# Patient Record
Sex: FEMALE | Race: WHITE | Employment: UNEMPLOYED | URBAN - METROPOLITAN AREA
[De-identification: names, ages, dates, MRNs, and addresses within clinical notes are randomized per-mention and may not be internally consistent; named-entity substitution may affect disease eponyms.]

---

## 2019-03-12 ENCOUNTER — HOSPITAL ENCOUNTER (OUTPATIENT)
Age: 54
Discharge: HOME | End: 2019-03-12
Payer: COMMERCIAL

## 2019-03-12 VITALS
DIASTOLIC BLOOD PRESSURE: 86 MMHG | HEART RATE: 65 BPM | BODY MASS INDEX: 22 KG/M2 | RESPIRATION RATE: 16 BRPM | TEMPERATURE: 98 F | WEIGHT: 150 LBS | SYSTOLIC BLOOD PRESSURE: 133 MMHG | OXYGEN SATURATION: 100 %

## 2019-03-12 DIAGNOSIS — R09.82 PND (POST-NASAL DRIP): ICD-10-CM

## 2019-03-12 DIAGNOSIS — J06.9 VIRAL URI WITH COUGH: Primary | ICD-10-CM

## 2019-03-12 LAB — POCT RAPID STREP: NEGATIVE

## 2019-03-12 PROCEDURE — 87430 STREP A AG IA: CPT | Performed by: NURSE PRACTITIONER

## 2019-03-12 PROCEDURE — 87081 CULTURE SCREEN ONLY: CPT | Performed by: NURSE PRACTITIONER

## 2019-03-12 PROCEDURE — 99204 OFFICE O/P NEW MOD 45 MIN: CPT

## 2019-03-12 RX ORDER — BENZONATATE 100 MG/1
100 CAPSULE ORAL 3 TIMES DAILY PRN
Qty: 30 CAPSULE | Refills: 0 | Status: SHIPPED | OUTPATIENT
Start: 2019-03-12 | End: 2019-04-11

## 2019-03-12 RX ORDER — IBUPROFEN 600 MG/1
600 TABLET ORAL ONCE
Status: COMPLETED | OUTPATIENT
Start: 2019-03-12 | End: 2019-03-12

## 2019-03-12 NOTE — ED PROVIDER NOTES
Patient Seen in: [address] [facility] [place]    History   Patient presents with:  Cough/URI  Sore Throat    Stated Complaint: sore throat    42-year-old female who presents to the immediate care with complaints of a sore throat and cough since Friday Pulse 65   Resp 16   Temp 97.5 °F (36.4 °C)   Temp src Temporal   SpO2 100 %   O2 Device None (Room air)       Current:/86   Pulse 65   Temp 97.5 °F (36.4 °C) (Temporal)   Resp 16   Wt 68 kg   SpO2 100%   BMI 22.15 kg/m²         Physical Exam   Karley problems or complications as discussed and voiced understanding and all questions were answered at this time.         Disposition and Plan     Clinical Impression:  Viral URI with cough  (primary encounter diagnosis)  PND (post-nasal drip)    Disposition:

## 2019-11-27 ENCOUNTER — HOSPITAL ENCOUNTER (OUTPATIENT)
Age: 54
Discharge: HOME | End: 2019-11-27
Attending: FAMILY MEDICINE
Payer: COMMERCIAL

## 2019-11-27 VITALS
OXYGEN SATURATION: 98 % | DIASTOLIC BLOOD PRESSURE: 78 MMHG | RESPIRATION RATE: 16 BRPM | HEIGHT: 69 IN | BODY MASS INDEX: 22.96 KG/M2 | HEART RATE: 67 BPM | SYSTOLIC BLOOD PRESSURE: 112 MMHG | TEMPERATURE: 97 F | WEIGHT: 155 LBS

## 2019-11-27 DIAGNOSIS — H66.90 ACUTE OTITIS MEDIA, UNSPECIFIED OTITIS MEDIA TYPE: Primary | ICD-10-CM

## 2019-11-27 DIAGNOSIS — J01.90 ACUTE SINUSITIS, RECURRENCE NOT SPECIFIED, UNSPECIFIED LOCATION: ICD-10-CM

## 2019-11-27 PROCEDURE — 99213 OFFICE O/P EST LOW 20 MIN: CPT

## 2019-11-27 PROCEDURE — 99214 OFFICE O/P EST MOD 30 MIN: CPT

## 2019-11-27 RX ORDER — IBUPROFEN 600 MG/1
600 TABLET ORAL ONCE
Status: COMPLETED | OUTPATIENT
Start: 2019-11-27 | End: 2019-11-27

## 2019-11-27 RX ORDER — METHYLPREDNISOLONE 4 MG/1
TABLET ORAL
Qty: 1 PACKAGE | Refills: 0 | Status: SHIPPED | OUTPATIENT
Start: 2019-11-27

## 2019-11-27 RX ORDER — AMOXICILLIN AND CLAVULANATE POTASSIUM 875; 125 MG/1; MG/1
1 TABLET, FILM COATED ORAL 2 TIMES DAILY
Qty: 20 TABLET | Refills: 0 | Status: SHIPPED | OUTPATIENT
Start: 2019-11-27 | End: 2019-12-07

## 2019-11-27 NOTE — ED INITIAL ASSESSMENT (HPI)
Patient started with scratchy throat last week. Yesterday she woke up congested, coughing- productive with yellow thick mucus. Today she has sinus pressure and right ear pain. No fevers.

## 2019-11-27 NOTE — ED PROVIDER NOTES
Patient Seen in: [address] [facility]      History   Patient presents with:  Cough/URI  Ear Problem Pain (neurosensory)    Stated Complaint: ear pain/cold symptoms    HPI    61-year-old female presents with sinus congestion, right ear pain, co auscultation bilaterally  Heart S1-S2 heard no murmurs no gallops  Skin shows no rash        ED Course   Labs Reviewed - No data to display             MDM     Right ear otitis media. Augmentin as directed.   Medrol Dosepak as directed congestion, sinus pa

## 2022-01-03 ENCOUNTER — HOSPITAL ENCOUNTER (OUTPATIENT)
Age: 57
Discharge: HOME | End: 2022-01-03
Payer: COMMERCIAL

## 2022-01-03 VITALS
BODY MASS INDEX: 23.7 KG/M2 | HEIGHT: 69 IN | OXYGEN SATURATION: 100 % | WEIGHT: 160 LBS | RESPIRATION RATE: 14 BRPM | SYSTOLIC BLOOD PRESSURE: 121 MMHG | DIASTOLIC BLOOD PRESSURE: 84 MMHG | TEMPERATURE: 97 F | HEART RATE: 64 BPM

## 2022-01-03 DIAGNOSIS — J01.90 ACUTE VIRAL SINUSITIS: ICD-10-CM

## 2022-01-03 DIAGNOSIS — H65.191 ACUTE EFFUSION OF RIGHT EAR: ICD-10-CM

## 2022-01-03 DIAGNOSIS — B97.89 ACUTE VIRAL SINUSITIS: ICD-10-CM

## 2022-01-03 DIAGNOSIS — Z11.52 ENCOUNTER FOR SCREENING FOR COVID-19: Primary | ICD-10-CM

## 2022-01-03 PROCEDURE — 99203 OFFICE O/P NEW LOW 30 MIN: CPT | Performed by: NURSE PRACTITIONER

## 2022-01-03 NOTE — ED PROVIDER NOTES
Patient Seen in: Immediate [address] [place] [address]      History   Patient presents with:  Testing    Stated Complaint: sore throat head ache coughing    Subjective:   HPI    CHIEF COMPLAINT:   Patient presents with:  Testing      HPI:   Kelley Edward is a 49 year Diagnosis Date   • Hypothyroid    • Hypothyroidism               Past Surgical History:   Procedure Laterality Date   • CHOLECYSTECTOMY     • OTHER      sinus surgery                Social History    Tobacco Use      Smoking status: Never Smoker      Smo Discussed physical exam and HPI with patient. Suspect viral etiology as no bacterial focus noted on exam.  Pt has reassuring physical exam consistent with viral URI. Lunges clear, no distress, respirations even and unlabored.   We discussed covid-19 vs ot

## 2022-01-05 LAB — SARS-COV-2 RNA RESP QL NAA+PROBE: DETECTED

## 2022-03-04 ENCOUNTER — HOSPITAL ENCOUNTER (OUTPATIENT)
Age: 57
Discharge: HOME | End: 2022-03-04
Payer: COMMERCIAL

## 2022-03-04 VITALS
BODY MASS INDEX: 23.7 KG/M2 | SYSTOLIC BLOOD PRESSURE: 135 MMHG | DIASTOLIC BLOOD PRESSURE: 80 MMHG | TEMPERATURE: 98 F | WEIGHT: 160 LBS | RESPIRATION RATE: 14 BRPM | OXYGEN SATURATION: 100 % | HEIGHT: 69 IN | HEART RATE: 82 BPM

## 2022-03-04 DIAGNOSIS — J01.90 ACUTE NON-RECURRENT SINUSITIS, UNSPECIFIED LOCATION: ICD-10-CM

## 2022-03-04 DIAGNOSIS — H69.83 EUSTACHIAN TUBE DYSFUNCTION, BILATERAL: Primary | ICD-10-CM

## 2022-03-04 PROCEDURE — 99203 OFFICE O/P NEW LOW 30 MIN: CPT | Performed by: PHYSICIAN ASSISTANT

## 2022-03-04 RX ORDER — AMOXICILLIN AND CLAVULANATE POTASSIUM 875; 125 MG/1; MG/1
1 TABLET, FILM COATED ORAL 2 TIMES DAILY
Qty: 20 TABLET | Refills: 0 | Status: SHIPPED | OUTPATIENT
Start: 2022-03-04 | End: 2022-03-14

## 2022-03-04 NOTE — ED INITIAL ASSESSMENT (HPI)
Pt states for over a week she has had sinus congestion and pressure. States now she has right ear pain and left neck pain. Taking otc meds and feels it is getting worse.  States she feels like it is a sinus infection

## 2022-04-18 ENCOUNTER — HOSPITAL ENCOUNTER (OUTPATIENT)
Age: 57
Discharge: HOME | End: 2022-04-18
Payer: COMMERCIAL

## 2022-04-18 VITALS
HEART RATE: 63 BPM | WEIGHT: 160 LBS | RESPIRATION RATE: 18 BRPM | BODY MASS INDEX: 23.7 KG/M2 | TEMPERATURE: 98 F | DIASTOLIC BLOOD PRESSURE: 81 MMHG | HEIGHT: 69 IN | SYSTOLIC BLOOD PRESSURE: 141 MMHG | OXYGEN SATURATION: 98 %

## 2022-04-18 DIAGNOSIS — H69.81 EUSTACHIAN TUBE DYSFUNCTION, RIGHT: Primary | ICD-10-CM

## 2022-04-18 DIAGNOSIS — H92.03 OTALGIA OF BOTH EARS: ICD-10-CM

## 2022-04-18 PROCEDURE — 99213 OFFICE O/P EST LOW 20 MIN: CPT | Performed by: NURSE PRACTITIONER

## 2022-04-18 RX ORDER — METHYLPREDNISOLONE 4 MG/1
TABLET ORAL
Qty: 1 EACH | Refills: 0 | Status: SHIPPED | OUTPATIENT
Start: 2022-04-18

## 2022-04-18 NOTE — ED INITIAL ASSESSMENT (HPI)
Pt was treated with Amoxil, Augmentin, and Zpack for sinus. Continues to c/o right ear pain and ST    Denies: fevers.

## 2022-07-14 ENCOUNTER — HOSPITAL ENCOUNTER (OUTPATIENT)
Age: 57
Discharge: HOME | End: 2022-07-14
Payer: COMMERCIAL

## 2022-07-14 VITALS
DIASTOLIC BLOOD PRESSURE: 68 MMHG | HEIGHT: 69 IN | BODY MASS INDEX: 23.7 KG/M2 | TEMPERATURE: 99 F | RESPIRATION RATE: 18 BRPM | HEART RATE: 64 BPM | WEIGHT: 160 LBS | OXYGEN SATURATION: 100 % | SYSTOLIC BLOOD PRESSURE: 126 MMHG

## 2022-07-14 DIAGNOSIS — H66.91 RIGHT OTITIS MEDIA, UNSPECIFIED OTITIS MEDIA TYPE: Primary | ICD-10-CM

## 2022-07-14 LAB — SARS-COV-2 RNA RESP QL NAA+PROBE: NOT DETECTED

## 2022-07-14 PROCEDURE — U0002 COVID-19 LAB TEST NON-CDC: HCPCS | Performed by: NURSE PRACTITIONER

## 2022-07-14 PROCEDURE — 99213 OFFICE O/P EST LOW 20 MIN: CPT | Performed by: NURSE PRACTITIONER

## 2022-07-14 RX ORDER — AZITHROMYCIN 250 MG/1
TABLET, FILM COATED ORAL
Qty: 6 TABLET | Refills: 0 | Status: SHIPPED | OUTPATIENT
Start: 2022-07-14 | End: 2022-07-19

## 2022-07-14 NOTE — ED INITIAL ASSESSMENT (HPI)
Pt here w/ c/o sore throat, sinus pressure, right ear pain, cough. Had some stomach discomfort at onset but that has resolved. Was around family that had covid on Saturday.

## 2022-08-17 ENCOUNTER — HOSPITAL ENCOUNTER (OUTPATIENT)
Age: 57
Discharge: HOME | End: 2022-08-17
Payer: COMMERCIAL

## 2022-08-17 VITALS
DIASTOLIC BLOOD PRESSURE: 89 MMHG | RESPIRATION RATE: 18 BRPM | OXYGEN SATURATION: 100 % | SYSTOLIC BLOOD PRESSURE: 122 MMHG | WEIGHT: 160 LBS | TEMPERATURE: 97 F | HEART RATE: 55 BPM | BODY MASS INDEX: 23.7 KG/M2 | HEIGHT: 69 IN

## 2022-08-17 DIAGNOSIS — R10.9 ABDOMINAL PAIN OF UNKNOWN ETIOLOGY: Primary | ICD-10-CM

## 2022-08-17 LAB
#MXD IC: 0.6 X10ˆ3/UL (ref 0.1–1)
BUN BLD-MCNC: 8 MG/DL (ref 7–18)
CHLORIDE BLD-SCNC: 100 MMOL/L (ref 98–112)
CO2 BLD-SCNC: 25 MMOL/L (ref 21–32)
CREAT BLD-MCNC: 0.7 MG/DL
GFR SERPLBLD BASED ON 1.73 SQ M-ARVRAT: 102 ML/MIN/1.73M2 (ref 60–?)
GLUCOSE BLD-MCNC: 90 MG/DL (ref 70–99)
HCT VFR BLD AUTO: 35.9 %
HCT VFR BLD CALC: 34 %
HGB BLD-MCNC: 12.2 G/DL
ISTAT IONIZED CALCIUM FOR CHEM 8: 1.19 MMOL/L (ref 1.12–1.32)
LYMPHOCYTES # BLD AUTO: 1.6 X10ˆ3/UL (ref 1–4)
LYMPHOCYTES NFR BLD AUTO: 31.9 %
MCH RBC QN AUTO: 30.5 PG (ref 26–34)
MCHC RBC AUTO-ENTMCNC: 34 G/DL (ref 31–37)
MCV RBC AUTO: 89.8 FL (ref 80–100)
MIXED CELL %: 11 %
NEUTROPHILS # BLD AUTO: 2.9 X10ˆ3/UL (ref 1.5–7.7)
NEUTROPHILS NFR BLD AUTO: 57.1 %
PLATELET # BLD AUTO: 266 X10ˆ3/UL (ref 150–450)
POCT BILIRUBIN URINE: NEGATIVE
POCT BLOOD URINE: NEGATIVE
POCT GLUCOSE URINE: NEGATIVE MG/DL
POCT KETONE URINE: NEGATIVE MG/DL
POCT NITRITE URINE: NEGATIVE
POCT PH URINE: 5.5 (ref 5–8)
POCT PROTEIN URINE: NEGATIVE MG/DL
POCT SPECIFIC GRAVITY URINE: 1.01
POCT URINE CLARITY: CLEAR
POCT URINE COLOR: YELLOW
POCT UROBILINOGEN URINE: 0.2 MG/DL
POTASSIUM BLD-SCNC: 3.8 MMOL/L (ref 3.6–5.1)
RBC # BLD AUTO: 4 X10ˆ6/UL
SODIUM BLD-SCNC: 136 MMOL/L (ref 136–145)
WBC # BLD AUTO: 5.1 X10ˆ3/UL (ref 4–11)

## 2022-08-17 PROCEDURE — 85025 COMPLETE CBC W/AUTO DIFF WBC: CPT | Performed by: NURSE PRACTITIONER

## 2022-08-17 PROCEDURE — 99213 OFFICE O/P EST LOW 20 MIN: CPT | Performed by: NURSE PRACTITIONER

## 2022-08-17 PROCEDURE — 81002 URINALYSIS NONAUTO W/O SCOPE: CPT | Performed by: NURSE PRACTITIONER

## 2022-08-17 PROCEDURE — 80047 BASIC METABLC PNL IONIZED CA: CPT | Performed by: NURSE PRACTITIONER

## 2022-08-17 NOTE — ED INITIAL ASSESSMENT (HPI)
Mid upper abd pain since Friday. States it is sharp and constant, no radiation. No nausea or vomiting but has no appetite. Few episodes of diarrhea, normal BM the last 3 days. Pt also feels she has a sinus infection. States congestion and pressure since Friday.

## 2022-11-11 ENCOUNTER — HOSPITAL ENCOUNTER (OUTPATIENT)
Age: 57
Discharge: HOME | End: 2022-11-11
Payer: COMMERCIAL

## 2022-11-11 VITALS
OXYGEN SATURATION: 100 % | TEMPERATURE: 98 F | SYSTOLIC BLOOD PRESSURE: 131 MMHG | RESPIRATION RATE: 18 BRPM | DIASTOLIC BLOOD PRESSURE: 79 MMHG | HEART RATE: 60 BPM

## 2022-11-11 DIAGNOSIS — H69.81 DYSFUNCTION OF RIGHT EUSTACHIAN TUBE: Primary | ICD-10-CM

## 2022-11-11 RX ORDER — PREDNISONE 20 MG/1
40 TABLET ORAL DAILY
Qty: 10 TABLET | Refills: 0 | Status: SHIPPED | OUTPATIENT
Start: 2022-11-11 | End: 2022-11-16

## 2022-12-16 ENCOUNTER — HOSPITAL ENCOUNTER (OUTPATIENT)
Age: 57
Discharge: HOME | End: 2022-12-16
Payer: COMMERCIAL

## 2022-12-16 VITALS
OXYGEN SATURATION: 97 % | RESPIRATION RATE: 16 BRPM | SYSTOLIC BLOOD PRESSURE: 99 MMHG | HEART RATE: 72 BPM | DIASTOLIC BLOOD PRESSURE: 67 MMHG | TEMPERATURE: 98 F

## 2022-12-16 DIAGNOSIS — J06.9 VIRAL URI: Primary | ICD-10-CM

## 2022-12-16 LAB
S PYO AG THROAT QL: NEGATIVE
SARS-COV-2 RNA RESP QL NAA+PROBE: NOT DETECTED

## 2022-12-16 PROCEDURE — 87880 STREP A ASSAY W/OPTIC: CPT | Performed by: NURSE PRACTITIONER

## 2022-12-16 PROCEDURE — 99213 OFFICE O/P EST LOW 20 MIN: CPT | Performed by: NURSE PRACTITIONER

## 2022-12-16 PROCEDURE — U0002 COVID-19 LAB TEST NON-CDC: HCPCS | Performed by: NURSE PRACTITIONER

## 2023-01-14 ENCOUNTER — TELEPHONE (OUTPATIENT)
Dept: FAMILY MEDICINE CLINIC | Facility: CLINIC | Age: 58
End: 2023-01-14

## 2023-01-30 ENCOUNTER — MED REC SCAN ONLY (OUTPATIENT)
Dept: FAMILY MEDICINE CLINIC | Facility: CLINIC | Age: 58
End: 2023-01-30

## 2023-03-27 ENCOUNTER — OFFICE VISIT (OUTPATIENT)
Dept: FAMILY MEDICINE CLINIC | Facility: CLINIC | Age: 58
End: 2023-03-27
Payer: COMMERCIAL

## 2023-03-27 VITALS
SYSTOLIC BLOOD PRESSURE: 100 MMHG | WEIGHT: 161 LBS | RESPIRATION RATE: 18 BRPM | TEMPERATURE: 98 F | BODY MASS INDEX: 23.85 KG/M2 | HEART RATE: 64 BPM | HEIGHT: 69 IN | DIASTOLIC BLOOD PRESSURE: 60 MMHG | OXYGEN SATURATION: 95 %

## 2023-03-27 DIAGNOSIS — E04.2 MULTINODULAR GOITER: ICD-10-CM

## 2023-03-27 DIAGNOSIS — Z12.11 SCREEN FOR COLON CANCER: ICD-10-CM

## 2023-03-27 DIAGNOSIS — J30.9 CHRONIC ALLERGIC RHINITIS: ICD-10-CM

## 2023-03-27 DIAGNOSIS — Z00.00 PREVENTATIVE HEALTH CARE: Primary | ICD-10-CM

## 2023-03-27 DIAGNOSIS — E03.9 ACQUIRED HYPOTHYROIDISM: ICD-10-CM

## 2023-03-27 DIAGNOSIS — R10.32 INTERMITTENT LEFT LOWER QUADRANT ABDOMINAL PAIN: ICD-10-CM

## 2023-03-27 PROCEDURE — 3008F BODY MASS INDEX DOCD: CPT | Performed by: FAMILY MEDICINE

## 2023-03-27 PROCEDURE — 3078F DIAST BP <80 MM HG: CPT | Performed by: FAMILY MEDICINE

## 2023-03-27 PROCEDURE — 99386 PREV VISIT NEW AGE 40-64: CPT | Performed by: FAMILY MEDICINE

## 2023-03-27 PROCEDURE — 3074F SYST BP LT 130 MM HG: CPT | Performed by: FAMILY MEDICINE

## 2023-03-27 RX ORDER — MONTELUKAST SODIUM 10 MG/1
10 TABLET ORAL DAILY
COMMUNITY
Start: 2022-11-22

## 2023-03-27 RX ORDER — LEVOCETIRIZINE DIHYDROCHLORIDE 5 MG/1
5 TABLET, FILM COATED ORAL EVERY EVENING
COMMUNITY

## 2023-03-27 RX ORDER — AZELASTINE HYDROCHLORIDE 0.5 MG/ML
SOLUTION/ DROPS OPHTHALMIC 2 TIMES DAILY
COMMUNITY

## 2023-03-27 RX ORDER — LEVOTHYROXINE SODIUM 112 UG/1
112 TABLET ORAL
COMMUNITY
Start: 2023-03-13

## 2023-03-27 RX ORDER — AZELASTINE 1 MG/ML
SPRAY, METERED NASAL 2 TIMES DAILY
COMMUNITY

## 2023-03-27 NOTE — PATIENT INSTRUCTIONS
1. Preventative health care  I reviewed age-appropriate preventative health screening exams as well as immunizations. Patient reports tetanus booster 2 years ago with her current job requirements. I reviewed signs and symptoms of skin cancer as well as the importance of frequent application of sunscreen. Discussed importance of lower carbohydrate food choices, avoidance of starches, eating behavior modification including intermittent fasting and importance of daily aerobic activity as well as weight training with a goal of 1-2 weight reduction per week  Reviewed current recommendations and guidelines for Pap and pelvic as well as physician directed breast exams. Patient prefers a female provider and will schedule with one of our nurse practitioners    2. Screen for colon cancer  reviewed current recommendations and options for colon cancer screening. Patient referred to Dr. Alexandra Carrillo for follow-up colonoscopy at her request to keep care local  - GASTRO - EXTERNAL    3. Acquired hypothyroidism  Continue current meds and repeat thyroid function studies annually, patient will be due in August    4. Multinodular goiter- benign FNA  Repeat ultrasound 1 year    5. Intermittent left lower quadrant abdominal pain  Discussed possible contributing factors including dietary, constipation, anxiety etc.  I would recommend the addition of magnesium oxide 400 mg at bedtime, MiraLAX daily and probiotics. .  We will also await the results of colonoscopy    6. Chronic allergic rhinitis  Carolynn common allergens and avoidance strategies. Recommend addition of Flonase.   Await response to dietary modification, follow-up with ENT as needed

## 2023-03-30 ENCOUNTER — PATIENT MESSAGE (OUTPATIENT)
Dept: FAMILY MEDICINE CLINIC | Facility: CLINIC | Age: 58
End: 2023-03-30

## 2023-03-30 RX ORDER — MONTELUKAST SODIUM 10 MG/1
10 TABLET ORAL DAILY
Qty: 90 TABLET | Refills: 0 | Status: SHIPPED | OUTPATIENT
Start: 2023-03-30

## 2023-03-30 NOTE — TELEPHONE ENCOUNTER
From: Luli Boyle  To: Sis Linares.  Aldo Duarte DO  Sent: 3/30/2023 11:48 AM CDT  Subject: Allergy medicine     I can't get in to see my allergist till June 6th and out of my Montelukast sodium 10 MG they said to check with my primary doctor to see if he would refill it for me Thanks Zoila Ding

## 2023-04-04 ENCOUNTER — TELEPHONE (OUTPATIENT)
Dept: FAMILY MEDICINE CLINIC | Facility: CLINIC | Age: 58
End: 2023-04-04

## 2023-04-04 NOTE — TELEPHONE ENCOUNTER
FYI: HER LAST COLONOSCOPY WAS IN 2014 AND HER INSURANCE WILL NOT COVER ANOTHER ONE IF IT IS BEFORE 10 YEARS

## 2023-05-17 ENCOUNTER — TELEPHONE (OUTPATIENT)
Dept: FAMILY MEDICINE CLINIC | Facility: CLINIC | Age: 58
End: 2023-05-17

## 2023-05-17 DIAGNOSIS — R91.8 MULTIPLE PULMONARY NODULES: Primary | ICD-10-CM

## 2023-05-17 NOTE — TELEPHONE ENCOUNTER
L/m to c/b re: below.     *If pt wants to have it done @ , we will need to fax order AND let referral dept know

## 2023-05-17 NOTE — TELEPHONE ENCOUNTER
Please advise patient that I received a copy of her kidney CT as ordered by her urologist.  In addition to the kidney lesion, there were scattered small nodules in the visualized portion of the lungs. These are likely benign but would recommend a dedicated chest CT to evaluate further. This can either be done through [employer] or [facility]. Just let me know where you would like to have it done. Lucas Louis.  Cb Felix

## 2023-05-20 ENCOUNTER — HOSPITAL ENCOUNTER (OUTPATIENT)
Age: 58
Discharge: HOME | End: 2023-05-20
Payer: COMMERCIAL

## 2023-05-20 VITALS
DIASTOLIC BLOOD PRESSURE: 79 MMHG | OXYGEN SATURATION: 99 % | TEMPERATURE: 99 F | HEIGHT: 69 IN | RESPIRATION RATE: 18 BRPM | WEIGHT: 155 LBS | HEART RATE: 85 BPM | SYSTOLIC BLOOD PRESSURE: 113 MMHG | BODY MASS INDEX: 22.96 KG/M2

## 2023-05-20 DIAGNOSIS — J06.9 UPPER RESPIRATORY TRACT INFECTION, UNSPECIFIED TYPE: Primary | ICD-10-CM

## 2023-05-20 LAB — S PYO AG THROAT QL: NEGATIVE

## 2023-05-20 PROCEDURE — 99213 OFFICE O/P EST LOW 20 MIN: CPT | Performed by: NURSE PRACTITIONER

## 2023-05-20 PROCEDURE — 87880 STREP A ASSAY W/OPTIC: CPT | Performed by: NURSE PRACTITIONER

## 2023-05-20 RX ORDER — PREDNISONE 20 MG/1
40 TABLET ORAL DAILY
Qty: 10 TABLET | Refills: 0 | Status: SHIPPED | OUTPATIENT
Start: 2023-05-20 | End: 2023-05-25

## 2023-05-20 NOTE — Clinical Note
Continue taking your azelastine nasal spray and allergy medications daily. Over-the-counter Benadryl at night as needed. Cool-mist humidifier in the same room. Hot steam showers, steam inhalations. Interventions for sore throat: Warm salt water  gargles 3 times daily. Mix a teaspoon of honey in the liquid such as juice or tea. Cough or throat drops. Drink plenty of fluids, mainly consisting of water. Follow-up with your doctor in 2 to 3 days. Go to the nearest ER for new or worsening s ymptoms.

## 2023-05-25 ENCOUNTER — TELEPHONE (OUTPATIENT)
Dept: FAMILY MEDICINE CLINIC | Facility: CLINIC | Age: 58
End: 2023-05-25

## 2023-05-25 NOTE — TELEPHONE ENCOUNTER
Pt was seen in [address] [place] [address] on 5/20/23. The notes say for cough/ URI, but pt reports she didn't have a cough. She had a h/a, sore throat and nasal congestion. Was prescribed prednisone 40mg daily x5 days. Reports she felt a lot better and finished prednisone yesterday. Then today woke up feeling bad again. C/o a \"pounding headache in the front and back of head\" and nasal congestion again. No cough, no SOB, no fever. Only taking allergy medication. Asks what else to do?     Pt aware this will be addressed on Friday

## 2023-05-25 NOTE — TELEPHONE ENCOUNTER
Pt went to urgent care on Saturday & was prescribed predniSONE. She was getting better but once she finished the meds she started feeling bad again.

## 2023-05-26 NOTE — TELEPHONE ENCOUNTER
RACHANA  Spoke with patient who states she is already trying all of those nasal sprays. Dr. Denita Villareal is booked at this point. Patient unable to come in today. She will come in tomorrow to see Bernadette Richardson.   Future Appointments   Date Time Provider Arias Franco   5/27/2023  9:00 AM [facility]   5/30/2023  3:00 PM OS CT RM 1 OS CT POST ACUTE MEDICAL [facility]

## 2023-05-26 NOTE — TELEPHONE ENCOUNTER
I have 2 appointments open on Friday, I would be happy to see her. Otherwise, she can use nasal saline, long-acting nasal decongestant spray such as Dristan or Afrin twice daily for no more than 3 days and Flonase 2 sprays per nostril daily.

## 2023-05-27 ENCOUNTER — OFFICE VISIT (OUTPATIENT)
Dept: FAMILY MEDICINE CLINIC | Facility: CLINIC | Age: 58
End: 2023-05-27
Payer: COMMERCIAL

## 2023-05-27 VITALS
HEART RATE: 80 BPM | SYSTOLIC BLOOD PRESSURE: 110 MMHG | TEMPERATURE: 99 F | HEIGHT: 69 IN | DIASTOLIC BLOOD PRESSURE: 75 MMHG | WEIGHT: 159 LBS | RESPIRATION RATE: 18 BRPM | BODY MASS INDEX: 23.55 KG/M2 | OXYGEN SATURATION: 100 %

## 2023-05-27 DIAGNOSIS — R09.81 NASAL CONGESTION: ICD-10-CM

## 2023-05-27 DIAGNOSIS — J01.10 ACUTE NON-RECURRENT FRONTAL SINUSITIS: Primary | ICD-10-CM

## 2023-05-27 LAB
COVID19 BINAX NOW ANTIGEN: NOT DETECTED
OPERATOR ID: NORMAL
POCT LOT NUMBER: NORMAL

## 2023-05-27 PROCEDURE — 3074F SYST BP LT 130 MM HG: CPT

## 2023-05-27 PROCEDURE — 3078F DIAST BP <80 MM HG: CPT

## 2023-05-27 PROCEDURE — 3008F BODY MASS INDEX DOCD: CPT

## 2023-05-27 PROCEDURE — 87637 SARSCOV2&INF A&B&RSV AMP PRB: CPT

## 2023-05-27 PROCEDURE — 99213 OFFICE O/P EST LOW 20 MIN: CPT

## 2023-05-27 RX ORDER — POLYETHYLENE GLYCOL 3350, SODIUM CHLORIDE, SODIUM BICARBONATE, POTASSIUM CHLORIDE 420; 11.2; 5.72; 1.48 G/4L; G/4L; G/4L; G/4L
POWDER, FOR SOLUTION ORAL
COMMUNITY
Start: 2023-03-29 | End: 2023-05-27 | Stop reason: ALTCHOICE

## 2023-05-27 RX ORDER — AMOXICILLIN AND CLAVULANATE POTASSIUM 875; 125 MG/1; MG/1
1 TABLET, FILM COATED ORAL 2 TIMES DAILY
Qty: 20 TABLET | Refills: 0 | Status: SHIPPED | OUTPATIENT
Start: 2023-05-27 | End: 2023-06-06

## 2023-05-27 RX ORDER — VALACYCLOVIR HYDROCHLORIDE 1 G/1
TABLET, FILM COATED ORAL
COMMUNITY
Start: 2023-05-22

## 2023-05-27 NOTE — PATIENT INSTRUCTIONS
Use sudafed or tylenol sever sinus over the counter for symptom management. Continue using nasal sprays and add afrin twice a day for three days. Antibiotic sent for possible sinusitis. If viral swab positive discontinue antibiotic.

## 2023-05-28 LAB
FLUAV + FLUBV RNA SPEC NAA+PROBE: NEGATIVE
FLUAV + FLUBV RNA SPEC NAA+PROBE: NEGATIVE
RSV RNA SPEC NAA+PROBE: NEGATIVE
SARS-COV-2 RNA RESP QL NAA+PROBE: DETECTED

## 2023-05-30 ENCOUNTER — PATIENT MESSAGE (OUTPATIENT)
Dept: FAMILY MEDICINE CLINIC | Facility: CLINIC | Age: 58
End: 2023-05-30

## 2023-05-30 NOTE — TELEPHONE ENCOUNTER
Yes, as mentioned in the office if your viral swab was positive it would be appropriate to stop the antibiotic.

## 2023-05-30 NOTE — TELEPHONE ENCOUNTER
From: Prasanth Enter  To: RAFA Rodriguez  Sent: 5/30/2023 7:51 AM CDT  Subject: Covid    I stopped taking my Amoxicillin that is what I was supposed to do? I still have a headache but no more diarrhea.  Mary Jo Jennings

## 2023-06-07 ENCOUNTER — HOSPITAL ENCOUNTER (OUTPATIENT)
Dept: CT IMAGING | Age: 58
Discharge: HOME | End: 2023-06-07
Attending: FAMILY MEDICINE
Payer: COMMERCIAL

## 2023-06-07 DIAGNOSIS — R91.8 MULTIPLE PULMONARY NODULES: ICD-10-CM

## 2023-06-07 DIAGNOSIS — I25.10 CORONARY ARTERY CALCIFICATION SEEN ON CAT SCAN: Primary | ICD-10-CM

## 2023-06-07 PROCEDURE — 71250 CT THORAX DX C-: CPT | Performed by: FAMILY MEDICINE

## 2023-06-08 ENCOUNTER — PATIENT MESSAGE (OUTPATIENT)
Dept: FAMILY MEDICINE CLINIC | Facility: CLINIC | Age: 58
End: 2023-06-08

## 2023-06-08 ENCOUNTER — TELEPHONE (OUTPATIENT)
Dept: FAMILY MEDICINE CLINIC | Facility: CLINIC | Age: 58
End: 2023-06-08

## 2023-06-08 NOTE — TELEPHONE ENCOUNTER
Pt is having CT of heart. They had 2 options. There was a $75 scan that included lab work & a $49 with no labs. She decided to do the $49 scan. She wanted to make sure that was ok. Her appt is on Sunday.

## 2023-06-08 NOTE — TELEPHONE ENCOUNTER
From: Osborn Lombard  To: Jose L Garcia. Claritza Chauhan,   Sent: 6/8/2023 11:49 AM CDT  Subject: Ct scan    Do I make the appointment with Miguel A Mercado?  Kofi Iraheta

## 2023-06-11 ENCOUNTER — APPOINTMENT (OUTPATIENT)
Dept: CT IMAGING | Age: 58
End: 2023-06-11
Attending: FAMILY MEDICINE

## 2023-06-12 ENCOUNTER — TELEPHONE (OUTPATIENT)
Dept: FAMILY MEDICINE CLINIC | Facility: CLINIC | Age: 58
End: 2023-06-12

## 2023-06-13 NOTE — TELEPHONE ENCOUNTER
Spoke with patient - asking if results would go to Dr. Nicolle Garrido - informed Dr. Nicolle Garrido did order test and results would go to him.

## 2023-06-16 ENCOUNTER — HOSPITAL ENCOUNTER (OUTPATIENT)
Dept: CT IMAGING | Age: 58
Discharge: HOME | End: 2023-06-16
Attending: FAMILY MEDICINE

## 2023-06-16 VITALS — DIASTOLIC BLOOD PRESSURE: 68 MMHG | SYSTOLIC BLOOD PRESSURE: 112 MMHG

## 2023-06-16 DIAGNOSIS — I25.10 CORONARY ARTERY CALCIFICATION SEEN ON CAT SCAN: ICD-10-CM

## 2023-06-16 NOTE — PROGRESS NOTES
Date of Service 2023    Keshawn Nguyen  Date of Birth 1965    Patient Age: 66year old    PCP: Marie Chery. Krupa Nolan DO  [address] [facility] [address]    Consult Type  Type Scan/Screening: Heart Scan  Preliminary Heart Scan Score: 238.76                  Nurse Review  Risk factor information and results reviewed with Nurse: Yes    Recommended Follow Up:  Consult your physician regarding[de-identified]   Final Heart Scan Report; Discuss potential for Incidental Finding          Recommendations for Change:  Nutrition Changes: Low Saturated Fat;Low Fat Dairy; Increase Fiber     Cholesterol Modification (goal of therapy depends upon your risk):   Decrease LDL (Lousy/Bad) Ideal <100     (Today's NON-FASTING Cholestech Values: Total Cholesterol-179, HDL-65, LDL-100, Triglycerides-69, Glucose-91)    Exercise: Enhance Current Program           Repeat Heart Scan:  3 Years if Calcium Score is > 0.0  Discuss with your Physician     Other[de-identified] Today's blood pressure readin/68; left arm, sitting. Jerri Recommended Resources:  Recommended Resources: Upcoming Classes, Medical Services and [facility]. Health. Sai Cope RN        Please Contact the Nurse Heart Line with any Questions or Concerns [phone].

## 2023-06-19 RX ORDER — LEVOTHYROXINE SODIUM 112 UG/1
112 TABLET ORAL
Qty: 90 TABLET | Refills: 0 | Status: SHIPPED | OUTPATIENT
Start: 2023-06-19

## 2023-06-21 DIAGNOSIS — Z79.899 ENCOUNTER FOR LONG-TERM (CURRENT) DRUG USE: Primary | ICD-10-CM

## 2023-06-21 DIAGNOSIS — I25.10 ATHEROSCLEROSIS OF NATIVE CORONARY ARTERY OF NATIVE HEART WITHOUT ANGINA PECTORIS: ICD-10-CM

## 2023-06-21 RX ORDER — ROSUVASTATIN CALCIUM 20 MG/1
20 TABLET, COATED ORAL NIGHTLY
Qty: 90 TABLET | Refills: 0 | Status: SHIPPED | OUTPATIENT
Start: 2023-06-21

## 2023-08-05 ENCOUNTER — NURSE ONLY (OUTPATIENT)
Dept: FAMILY MEDICINE CLINIC | Facility: CLINIC | Age: 58
End: 2023-08-05
Payer: COMMERCIAL

## 2023-08-05 DIAGNOSIS — I25.10 ATHEROSCLEROSIS OF NATIVE CORONARY ARTERY OF NATIVE HEART WITHOUT ANGINA PECTORIS: ICD-10-CM

## 2023-08-05 DIAGNOSIS — Z79.899 ENCOUNTER FOR LONG-TERM (CURRENT) DRUG USE: ICD-10-CM

## 2023-08-05 LAB
ALT SERPL-CCNC: 32 U/L
AST SERPL-CCNC: 20 U/L (ref 15–37)
CHOLEST SERPL-MCNC: 141 MG/DL (ref ?–200)
FASTING PATIENT LIPID ANSWER: YES
HDLC SERPL-MCNC: 77 MG/DL (ref 40–59)
LDLC SERPL CALC-MCNC: 54 MG/DL (ref ?–100)
NONHDLC SERPL-MCNC: 64 MG/DL (ref ?–130)
TRIGL SERPL-MCNC: 42 MG/DL (ref 30–149)
VLDLC SERPL CALC-MCNC: 6 MG/DL (ref 0–30)

## 2023-08-05 PROCEDURE — 84450 TRANSFERASE (AST) (SGOT): CPT | Performed by: FAMILY MEDICINE

## 2023-08-05 PROCEDURE — 84460 ALANINE AMINO (ALT) (SGPT): CPT | Performed by: FAMILY MEDICINE

## 2023-08-05 PROCEDURE — 80061 LIPID PANEL: CPT | Performed by: FAMILY MEDICINE

## 2023-08-05 NOTE — PROGRESS NOTES
Verla Najjar presents today for nurse visit. Labs ordered by Ruslan Abraham . light green drawn from right ac area with 1 attempt using a straight needle. Patient tolerated well. Left office in stable condition.

## 2023-08-07 DIAGNOSIS — Z79.899 ENCOUNTER FOR LONG-TERM (CURRENT) DRUG USE: ICD-10-CM

## 2023-08-07 DIAGNOSIS — I25.10 ATHEROSCLEROSIS OF NATIVE CORONARY ARTERY OF NATIVE HEART WITHOUT ANGINA PECTORIS: ICD-10-CM

## 2023-08-07 DIAGNOSIS — Z00.00 PREVENTATIVE HEALTH CARE: Primary | ICD-10-CM

## 2023-09-16 DIAGNOSIS — E03.9 ACQUIRED HYPOTHYROIDISM: Primary | ICD-10-CM

## 2023-09-18 RX ORDER — ROSUVASTATIN CALCIUM 20 MG/1
20 TABLET, COATED ORAL NIGHTLY
Qty: 90 TABLET | Refills: 0 | Status: SHIPPED | OUTPATIENT
Start: 2023-09-18

## 2023-09-18 NOTE — TELEPHONE ENCOUNTER
Last OV 03/27  Last refill 06/19 #90  LABS   Requested Prescriptions     Pending Prescriptions Disp Refills    LEVOTHYROXINE 112 MCG Oral Tab [Pharmacy Med Name: LEVOTHYROXINE 112 MCG TABLET] 90 tablet 0     Sig: TAKE 1 TABLET BY MOUTH BEFORE BREAKFAST.          Called pt - TSH ordered - will wait for results to send medication refill  Future Appointments   Date Time Provider Arias Franco   9/20/2023  7:00 AM REF [facility] REF [facility] Ref Lab [facility]

## 2023-09-20 ENCOUNTER — LABORATORY ENCOUNTER (OUTPATIENT)
Dept: LAB | Age: 58
End: 2023-09-20
Attending: FAMILY MEDICINE
Payer: COMMERCIAL

## 2023-09-20 DIAGNOSIS — E03.9 ACQUIRED HYPOTHYROIDISM: ICD-10-CM

## 2023-09-20 LAB
T4 FREE SERPL-MCNC: 1.2 NG/DL (ref 0.8–1.7)
TSI SER-ACNC: 0.32 MIU/ML (ref 0.36–3.74)

## 2023-09-20 PROCEDURE — 84443 ASSAY THYROID STIM HORMONE: CPT | Performed by: FAMILY MEDICINE

## 2023-09-20 PROCEDURE — 84439 ASSAY OF FREE THYROXINE: CPT | Performed by: FAMILY MEDICINE

## 2023-09-25 RX ORDER — LEVOTHYROXINE SODIUM 112 UG/1
112 TABLET ORAL
Qty: 90 TABLET | Refills: 1 | Status: SHIPPED | OUTPATIENT
Start: 2023-09-25

## 2023-09-25 RX ORDER — LEVOTHYROXINE SODIUM 112 UG/1
112 TABLET ORAL
Qty: 90 TABLET | Refills: 0 | OUTPATIENT
Start: 2023-09-25

## 2023-09-27 ENCOUNTER — HOSPITAL ENCOUNTER (OUTPATIENT)
Age: 58
Discharge: HOME | End: 2023-09-27
Payer: COMMERCIAL

## 2023-09-27 VITALS
RESPIRATION RATE: 16 BRPM | TEMPERATURE: 97 F | DIASTOLIC BLOOD PRESSURE: 74 MMHG | OXYGEN SATURATION: 99 % | SYSTOLIC BLOOD PRESSURE: 114 MMHG | HEART RATE: 60 BPM | HEIGHT: 69 IN | BODY MASS INDEX: 23.7 KG/M2 | WEIGHT: 160 LBS

## 2023-09-27 DIAGNOSIS — H69.93 EUSTACHIAN TUBE DYSFUNCTION, BILATERAL: Primary | ICD-10-CM

## 2023-09-27 PROCEDURE — 99213 OFFICE O/P EST LOW 20 MIN: CPT | Performed by: PHYSICIAN ASSISTANT

## 2023-09-27 RX ORDER — PREDNISONE 20 MG/1
40 TABLET ORAL DAILY
Qty: 10 TABLET | Refills: 0 | Status: SHIPPED | OUTPATIENT
Start: 2023-09-27 | End: 2023-10-02

## 2023-09-27 NOTE — DISCHARGE INSTRUCTIONS
Purchase Sudafed directly from the pharmacist.  Continue your home medications. Add the steroid course.

## 2023-11-06 ENCOUNTER — MED REC SCAN ONLY (OUTPATIENT)
Dept: FAMILY MEDICINE CLINIC | Facility: CLINIC | Age: 58
End: 2023-11-06

## 2023-12-19 RX ORDER — ROSUVASTATIN CALCIUM 20 MG/1
20 TABLET, COATED ORAL NIGHTLY
Qty: 90 TABLET | Refills: 0 | Status: SHIPPED | OUTPATIENT
Start: 2023-12-19

## 2023-12-19 NOTE — TELEPHONE ENCOUNTER
Last refill: 09/18/23  Qty: 90  W/ 0 refills  Last ov: 03/27/23    Requested Prescriptions     Pending Prescriptions Disp Refills    ROSUVASTATIN 20 MG Oral Tab [Pharmacy Med Name: ROSUVASTATIN CALCIUM 20 MG TAB] 90 tablet 0     Sig: TAKE 1 TABLET BY MOUTH EVERY DAY AT NIGHT     No future appointments.

## 2024-02-10 ENCOUNTER — HOSPITAL ENCOUNTER (OUTPATIENT)
Age: 59
Discharge: HOME | End: 2024-02-10
Payer: COMMERCIAL

## 2024-02-10 VITALS
OXYGEN SATURATION: 98 % | WEIGHT: 159 LBS | TEMPERATURE: 99 F | HEIGHT: 69 IN | DIASTOLIC BLOOD PRESSURE: 79 MMHG | BODY MASS INDEX: 23.55 KG/M2 | RESPIRATION RATE: 18 BRPM | SYSTOLIC BLOOD PRESSURE: 128 MMHG | HEART RATE: 87 BPM

## 2024-02-10 DIAGNOSIS — J06.9 UPPER RESPIRATORY VIRUS: Primary | ICD-10-CM

## 2024-02-10 LAB — SARS-COV-2 RNA RESP QL NAA+PROBE: NOT DETECTED

## 2024-02-10 RX ORDER — OLOPATADINE HYDROCHLORIDE AND MOMETASONE FUROATE 25; 665 UG/1; UG/1
2 SPRAY, METERED NASAL DAILY
COMMUNITY
Start: 2024-01-01

## 2024-02-10 NOTE — ED INITIAL ASSESSMENT (HPI)
Pt started with sore throat on Thursday. Now having sinus pressure, headache, and congestion. No fever.

## 2024-02-10 NOTE — DISCHARGE INSTRUCTIONS
Push fluids. Rest. Tylenol or Motrin for pain or fever. You can try a decongestant. Follow up with your doctor. Return for any concerns.

## 2024-02-10 NOTE — ED PROVIDER NOTES
Patient Seen in: [facility]      History     Chief Complaint   Patient presents with    Sore Throat    Nasal Congestion    Sinus Problem     Stated Complaint: upper respiratory symptoms  Subjective:   58-year-old female presents for sinus and nasal congestion and a sore throat that started 2 days ago.  The sore throat is what she woke up with 2 days ago.  No difficulty swallowing.  Speech is clear.  Sore throat is mostly resolved.  No coughing.  No difficulty swallowing.  No chest pain or difficulty breathing.  No fevers.  No chills.  She states she is getting a lot of drainage from her nose.  She is eating and drinking without vomiting or diarrhea.  No neck stiffness.  No rashes.  She does have a generalized headache.  No dizziness.  She appears nontoxic.      Objective:   Past Medical History:   Diagnosis Date    Allergic rhinitis     Benign cyst of right kidney     followed by Dr Deven Marie- [place] [facility], [facility];e MRI 2/20/23    Hyperlipidemia     Hypothyroidism             Past Surgical History:   Procedure Laterality Date    CHOLECYSTECTOMY      OTHER      sinus surgery    SINUS SURGERY    2011              Social History     Socioeconomic History    Marital status:    Tobacco Use    Smoking status: Never     Passive exposure: Never    Smokeless tobacco: Never   Vaping Use    Vaping Use: Never used   Substance and Sexual Activity    Alcohol use: Not Currently     Comment: none x 5 months            Review of Systems   HENT:  Positive for rhinorrhea and sore throat.    Neurological:  Positive for headaches.   All other systems reviewed and are negative.      Positive for stated complaint: upper respiratory symptoms  Other systems are as noted in HPI.  Constitutional and vital signs reviewed.      All other systems reviewed and negative except as noted above.    Physical Exam     ED Triage Vitals [02/10/24 0906]   /79   Pulse 87   Resp 18   Temp 98.5 °F (36.9 °C)   Temp src Temporal    SpO2 98 %   O2 Device None (Room air)     Current:/79   Pulse 87   Temp 98.5 °F (36.9 °C) (Temporal)   Resp 18   Ht 175.3 cm (5' 9\")   Wt 72.1 kg   LMP 02/17/2015   SpO2 98%   BMI 23.48 kg/m²     Physical Exam  Vitals and nursing note reviewed.   Constitutional:       General: She is not in acute distress.     Appearance: She is well-developed. She is not toxic-appearing.   HENT:      Head: Normocephalic.      Right Ear: Tympanic membrane normal.      Left Ear: Tympanic membrane normal.      Nose: Congestion and rhinorrhea present.      Mouth/Throat:      Mouth: Mucous membranes are moist.      Pharynx: Posterior oropharyngeal erythema present. No pharyngeal swelling, oropharyngeal exudate or uvula swelling.      Tonsils: No tonsillar exudate or tonsillar abscesses.   Eyes:      Conjunctiva/sclera: Conjunctivae normal.      Pupils: Pupils are equal, round, and reactive to light.   Cardiovascular:      Rate and Rhythm: Normal rate and regular rhythm.   Pulmonary:      Effort: Pulmonary effort is normal.      Breath sounds: Normal breath sounds. No wheezing, rhonchi or rales.   Musculoskeletal:      Cervical back: Normal range of motion.   Lymphadenopathy:      Cervical: No cervical adenopathy.   Skin:     General: Skin is warm and dry.      Capillary Refill: Capillary refill takes less than 2 seconds.      Findings: No rash.   Neurological:      General: No focal deficit present.      Mental Status: She is alert and oriented to person, place, and time.   Psychiatric:         Mood and Affect: Mood normal.         Behavior: Behavior normal.         ED Course     Labs Reviewed   RAPID SARS-COV-2 BY PCR - Normal       [facility]     Medical Decision Making  The COVID test is negative.  The patient is aware.  Her symptoms are most likely viral.  We discussed continuing using a nasal spray, starting a decongestant, pushing fluids, rest, ibuprofen or Tylenol as needed for pain or fever, and follow-up as needed with  her doctor.    Amount and/or Complexity of Data Reviewed  Independent Historian: spouse  Labs: ordered.     Details: COVID-negative    Risk  OTC drugs.  Risk Details: Differential diagnoses are COVID versus an upper respiratory virus.        Disposition and Plan     Clinical Impression:  1. Upper respiratory virus         Disposition:  Discharge  2/10/2024  9:29 am    Follow-up:  Jason Parmar, DO  [address] [facility] [address]  [phone]    Schedule an appointment as soon as possible for a visit in 2 days            Medications Prescribed:  Discharge Medication List as of 2/10/2024  9:30 AM

## 2024-02-16 ENCOUNTER — OFFICE VISIT (OUTPATIENT)
Dept: FAMILY MEDICINE CLINIC | Facility: CLINIC | Age: 59
End: 2024-02-16
Payer: COMMERCIAL

## 2024-02-16 ENCOUNTER — HOSPITAL ENCOUNTER (OUTPATIENT)
Dept: GENERAL RADIOLOGY | Age: 59
Discharge: HOME | End: 2024-02-16
Attending: FAMILY MEDICINE
Payer: COMMERCIAL

## 2024-02-16 VITALS
BODY MASS INDEX: 23.99 KG/M2 | SYSTOLIC BLOOD PRESSURE: 110 MMHG | HEIGHT: 69 IN | DIASTOLIC BLOOD PRESSURE: 70 MMHG | OXYGEN SATURATION: 98 % | TEMPERATURE: 97 F | WEIGHT: 162 LBS | RESPIRATION RATE: 18 BRPM | HEART RATE: 89 BPM

## 2024-02-16 DIAGNOSIS — Q66.72 PES CAVUS OF BOTH FEET: ICD-10-CM

## 2024-02-16 DIAGNOSIS — Q66.71 PES CAVUS OF BOTH FEET: ICD-10-CM

## 2024-02-16 DIAGNOSIS — M79.672 PAIN OF LEFT HEEL: Primary | ICD-10-CM

## 2024-02-16 DIAGNOSIS — M79.672 PAIN OF LEFT HEEL: ICD-10-CM

## 2024-02-16 PROCEDURE — 73650 X-RAY EXAM OF HEEL: CPT | Performed by: FAMILY MEDICINE

## 2024-02-16 PROCEDURE — 3074F SYST BP LT 130 MM HG: CPT | Performed by: FAMILY MEDICINE

## 2024-02-16 PROCEDURE — 99213 OFFICE O/P EST LOW 20 MIN: CPT | Performed by: FAMILY MEDICINE

## 2024-02-16 PROCEDURE — 3008F BODY MASS INDEX DOCD: CPT | Performed by: FAMILY MEDICINE

## 2024-02-16 PROCEDURE — 3078F DIAST BP <80 MM HG: CPT | Performed by: FAMILY MEDICINE

## 2024-02-16 NOTE — PATIENT INSTRUCTIONS
I discussed the diagnosis and likely contributing factors.  Discussed appropriate footwear with good arch supports.  Would avoid doing excessive amounts of stairs for exercise for now.  Would recommend walking on a cushioned surface rather than concrete or asphalt  Would recommend opinion from podiatry, patient referred to Dr. Evangelista  Discussed proper calf and heel cord stretches

## 2024-02-16 NOTE — PROGRESS NOTES
Mi Hernández is a 58 year old female.  Chief Complaint   Patient presents with    Foot Pain     C/o foot pain left x 3 months     HPI:   C/o left foot/heel pain x 3 months,no acute injury  Daily walks, jump rope, pain prior to jumping rope, uses stairs at school for exercise  Current Outpatient Medications   Medication Sig Dispense Refill    RYALTRIS 665-25 MCG/ACT Nasal Suspension 2 sprays by Nasal route daily.      ROSUVASTATIN 20 MG Oral Tab TAKE 1 TABLET BY MOUTH EVERY DAY AT NIGHT 90 tablet 0    levothyroxine 112 MCG Oral Tab TAKE 1 TABLET BY MOUTH BEFORE BREAKFAST. 90 tablet 1    valACYclovir 1 G Oral Tab       levocetirizine 5 MG Oral Tab Take 1 tablet (5 mg total) by mouth every evening.        Past Medical History:   Diagnosis Date    Allergic rhinitis     Benign cyst of right kidney     followed by Dr Deven Marie- [place] [facility], [facility];e MRI 2/20/23    Hyperlipidemia     Hypothyroidism       Social History:  Social History     Socioeconomic History    Marital status:    Tobacco Use    Smoking status: Never     Passive exposure: Never    Smokeless tobacco: Never   Vaping Use    Vaping Use: Never used   Substance and Sexual Activity    Alcohol use: Not Currently     Comment: none x 5 months    Drug use: Never   Other Topics Concern    Caffeine Concern No    Exercise No    Seat Belt No    Special Diet No    Stress Concern No    Weight Concern Yes     Comment: Would like to loose some weight        REVIEW OF SYSTEMS:   GENERAL HEALTH: feels well otherwise, denies fatigue, appetite ok  SKIN: denies any unusual skin lesions or rashes  ENT: denies nasal congestion, pnd, sore throat, ear pain or pressure, decreased hearing  RESPIRATORY: denies shortness of breath with exertion,cough, wheezing  CARDIOVASCULAR: denies chest pain on exertion, edema  GI: denies abdominal pain and denies heartburn  NEURO: denies headaches  MSK: see hpi     EXAM:   /70   Pulse 89   Temp 96.9 °F (36.1 °C) (Temporal)    Resp 18   Ht 5' 9\" (1.753 m)   Wt 162 lb (73.5 kg)   LMP 02/17/2015   SpO2 98%   BMI 23.92 kg/m²   GENERAL: well developed, well nourished,in no apparent distress, well hydrated  SKIN: no rashes,no suspicious lesions  ENT: TMs: intact, good mobility, Nose: turbinates clear, no dc, Throat: no erythema, pnd, or lesions  NECK: supple,no adenopathy,no bruits, no thyromegaly  LUNGS: clear to auscultation, no w/r/r  CARDIO: RRR without murmur, peripheral pulses intact  GI: good BS's,no masses, HSM or tenderness  EXTREMITIES: FROM of all joints  Left foot/ankle: High medial longitudinal arch, no pain to palpation along the plantar fascia, mild discomfort across the ankle mortise and medially, pain primarily on the lateral aspect of the calcaneus while heel walking, no pain walking on toes, slight tight calf muscles, no pain or defect to palpation along the Achilles tendon or calcaneal bursa, strong palpable pulses, no gross deformities, no pain with resisted, nonweightbearing dorsiflexion, plantarflexion, inversion and eversion  Good hamstring flexibility bilaterally  XR left heel: +inferior calcaneal spur  ASSESSMENT AND PLAN:     Encounter Diagnoses   Name Primary?    Pain of left heel Yes    Pes cavus of both feet      No orders of the defined types were placed in this encounter.    Meds & Refills for this Visit:  Requested Prescriptions      No prescriptions requested or ordered in this encounter     Imaging & Consults:  PODIATRY - EXTERNAL  No follow-ups on file.  Patient Instructions   I discussed the diagnosis and likely contributing factors.  Discussed appropriate footwear with good arch supports.  Would avoid doing excessive amounts of stairs for exercise for now.  Would recommend walking on a cushioned surface rather than concrete or asphalt  Would recommend opinion from podiatry, patient referred to Dr. Evangelista  Discussed proper calf and heel cord stretches  The patient indicates understanding of these  issues and agrees to the plan.    Jason Parmar DO, FAAFP

## 2024-02-19 ENCOUNTER — TELEPHONE (OUTPATIENT)
Dept: FAMILY MEDICINE CLINIC | Facility: CLINIC | Age: 59
End: 2024-02-19

## 2024-03-18 RX ORDER — ROSUVASTATIN CALCIUM 20 MG/1
20 TABLET, COATED ORAL NIGHTLY
Qty: 90 TABLET | Refills: 0 | Status: SHIPPED | OUTPATIENT
Start: 2024-03-18

## 2024-03-23 RX ORDER — LEVOTHYROXINE SODIUM 112 UG/1
112 TABLET ORAL
Qty: 90 TABLET | Refills: 1 | Status: SHIPPED | OUTPATIENT
Start: 2024-03-23

## 2024-04-30 RX ORDER — VALACYCLOVIR HYDROCHLORIDE 1 G/1
TABLET, FILM COATED ORAL
Qty: 21 TABLET | Refills: 0 | OUTPATIENT
Start: 2024-04-30

## 2024-06-14 RX ORDER — ROSUVASTATIN CALCIUM 20 MG/1
20 TABLET, COATED ORAL NIGHTLY
Qty: 90 TABLET | Refills: 0 | Status: SHIPPED | OUTPATIENT
Start: 2024-06-14

## 2024-06-14 NOTE — TELEPHONE ENCOUNTER
OV 02/16/24  LABS 08/2023    REFILL 03/18/24 #90    No future appointments. Mychart sent to patient to schedule physical

## 2024-07-01 ENCOUNTER — TELEPHONE (OUTPATIENT)
Dept: FAMILY MEDICINE CLINIC | Facility: CLINIC | Age: 59
End: 2024-07-01

## 2024-07-01 NOTE — TELEPHONE ENCOUNTER
Phone call from patient.  States has had a rash for 2-3 days.  Rash is on right breast, armpit, under right arm, left arm and neck.    No fever.   Red, raised, itchy.   Unsure if she was bit by a spider.    Started with a couple of spots and is now spreading.  Tried Cortisone.

## 2024-07-01 NOTE — TELEPHONE ENCOUNTER
Appointment scheduled.   Future Appointments   Date Time Provider Department Center   7/2/2024  3:30 PM Jason Parmar, DO [facility] [place]   8/7/2024  8:00 AM REF [facility] REF [facility] Ref Lab [place]

## 2024-07-01 NOTE — TELEPHONE ENCOUNTER
If spreading despite over the counter topical steroid can be seen in office, urgent care or walk in clinic.

## 2024-07-01 NOTE — TELEPHONE ENCOUNTER
Clinical algorithm to assess & rule-out Measles when a patient presents with a suspected measles case.  (Click F2 to highlight the * jump to the next * and type your answers or delete any text at will.)  -Onset of symptoms: 2-3 days ago  -Fever: No  -Temp: UnknownoF  -Generalized maculopapular - Rash (starting on face and head, spreading to trunk, then extremities): No  At least one of the following:  -Cough: no  -Nasal Congestion: no  -Conjunctivitis: no  -Koplik's spots (clustered, white lesions on the buccal mucosa): no  ____________________________________________________________________________    Additional questions if patient has fever, rash or one of (cough, nasal congestion, conjunctivitis or Koplik's spots).   -Does the patient have immunity? (e.g. previously vaccinated or born before 1955): unsure  -Known exposure to measles: no  -Travel within 21 days internationally or to area of known measles outbreak? no  -Vaccine hx:    Immunization History  Administered            Date(s) Administered    Covid-19 Vaccine CiteeCar (J&J) 0.5ml                          05/11/2021      DTAP                  11/09/2021      FLUZONE 6 months and older PFS 0.5 ml (11559)                          10/11/2019      Flucelvax 0.5 Ml Quad PFS Single Dose                          10/13/2022      Influenza             10/21/2015      TDAP                  05/06/2010      Zoster Vaccine Recombinant Adjuvanted (Shingrix)                          09/28/2020 12/11/2020      __________________________________________________________________________________________________________    Lab Information:  PCR: If the provider decides to proceed with PCR testing to r/o measles. Contact Infection Prevention & Control Department ([facility]) at ext. [phone]. [facility] ID dept will reach out to [place] Department [facility] Public [facility] ([facility]) to obtain authorization for the PCR lab test and send it to an [facility] laboratory.  PCR collection: Use the  [employer] swab ( with red liquid), swab the throat and send it to lab, this will be then sent out to the [facility] lab as noted above.  Measles antibody (AB) IgG & IgM: The IgG is performed in house and the IgM is a send out test - the turnaround time is between 24-72 hours (this time may vary depending on how busy the lab is).  Measles antibody (AB) IgG & IgM collection: Draw blood in a gold-top tube.

## 2024-07-02 ENCOUNTER — OFFICE VISIT (OUTPATIENT)
Dept: FAMILY MEDICINE CLINIC | Facility: CLINIC | Age: 59
End: 2024-07-02
Payer: COMMERCIAL

## 2024-07-02 VITALS
SYSTOLIC BLOOD PRESSURE: 112 MMHG | OXYGEN SATURATION: 97 % | HEIGHT: 69 IN | BODY MASS INDEX: 24.24 KG/M2 | TEMPERATURE: 99 F | RESPIRATION RATE: 18 BRPM | HEART RATE: 61 BPM | DIASTOLIC BLOOD PRESSURE: 78 MMHG | WEIGHT: 163.63 LBS

## 2024-07-02 DIAGNOSIS — L30.9 DERMATITIS: Primary | ICD-10-CM

## 2024-07-02 PROCEDURE — 3074F SYST BP LT 130 MM HG: CPT | Performed by: FAMILY MEDICINE

## 2024-07-02 PROCEDURE — 3078F DIAST BP <80 MM HG: CPT | Performed by: FAMILY MEDICINE

## 2024-07-02 PROCEDURE — 3008F BODY MASS INDEX DOCD: CPT | Performed by: FAMILY MEDICINE

## 2024-07-02 PROCEDURE — 99213 OFFICE O/P EST LOW 20 MIN: CPT | Performed by: FAMILY MEDICINE

## 2024-07-02 RX ORDER — TRIAMCINOLONE ACETONIDE 5 MG/G
1 CREAM TOPICAL 3 TIMES DAILY
Qty: 15 G | Refills: 0 | Status: SHIPPED | OUTPATIENT
Start: 2024-07-02

## 2024-07-02 NOTE — PATIENT INSTRUCTIONS
I discussed possible causes and contributing factors.  Discussed management strategies  Recommend cooler baths or showers, pat dry, avoid rubbing, cut nails short, may use diphenhydramine 25 mg at bedtime and loratadine 10 mg in the morning for itching, may use ice massage for itching  Triamcinolone 0.5% cream 3 times daily to affected areas till clear  Call or follow-up if no significant improvement over the next 7 to 10 days

## 2024-07-02 NOTE — PROGRESS NOTES
Mi Hernández is a 59 year old female.  Chief Complaint   Patient presents with    Rash     Rash started Friday on inner right arm along right side and chest      HPI:   X 4 days c/o itchy rash on right upper arm, now on right arm  No new creams, lotions, fabric softeners, laundry detergents, clothing etc.  No recent travel, no exposures to anyone else with similar rashes, no pets, no hot tubs or saunas  Current Outpatient Medications   Medication Sig Dispense Refill    ROSUVASTATIN 20 MG Oral Tab TAKE 1 TABLET BY MOUTH EVERY DAY AT NIGHT 90 tablet 0    LEVOTHYROXINE 112 MCG Oral Tab TAKE 1 TABLET BY MOUTH EVERY DAY BEFORE BREAKFAST 90 tablet 1    RYALTRIS 665-25 MCG/ACT Nasal Suspension 2 sprays by Nasal route daily.      valACYclovir 1 G Oral Tab       levocetirizine 5 MG Oral Tab Take 1 tablet (5 mg total) by mouth every evening.        Past Medical History:    Allergic rhinitis    Benign cyst of right kidney    followed by Dr Deven Marie- [place] [facility], [facility];e MRI 2/20/23    Hyperlipidemia    Hypothyroidism      Social History:  Social History     Socioeconomic History    Marital status:    Tobacco Use    Smoking status: Never     Passive exposure: Never    Smokeless tobacco: Never   Vaping Use    Vaping status: Never Used   Substance and Sexual Activity    Alcohol use: Not Currently     Comment: none x 5 months    Drug use: Never   Other Topics Concern    Caffeine Concern No    Exercise No    Seat Belt No    Special Diet No    Stress Concern No    Weight Concern Yes     Comment: Would like to loose some weight     Social Determinants of Health      [facility]    Social Connections    [facility] from [facility], [facility]    Housing Stability        REVIEW OF SYSTEMS:   GENERAL HEALTH: feels well otherwise, denies fatigue, appetite ok  SKIN: See HPI  ENT: denies nasal congestion, pnd, sore throat,  ear pain or pressure, decreased hearing  RESPIRATORY: denies shortness of breath with exertion,cough, wheezing  CARDIOVASCULAR: denies chest pain on exertion, edema  GI: denies abdominal pain and denies heartburn  NEURO: denies headaches  PSYCH: denies feeling stressed or anxious    EXAM:   /78 (BP Location: Left arm, Patient Position: Sitting, Cuff Size: adult)   Pulse 61   Temp 98.5 °F (36.9 °C) (Temporal)   Resp 18   Ht 5' 9\" (1.753 m)   Wt 163 lb 9.6 oz (74.2 kg)   LMP 02/17/2015   SpO2 97%   BMI 24.16 kg/m²   GENERAL: well developed, well nourished,in no apparent distress, well hydrated  SKIN: 2 to 4 mm erythematous blanchable macules on right upper inner arm, right anterior axillary line, left forearm, small spot on left side of the neck, no vesicles or ulcerations  ENT: TMs: intact, good mobility, Nose: turbinates clear, no dc, Throat: no erythema, pnd, or lesions  NECK: supple,no adenopathy,no bruits, no thyromegaly  LUNGS: clear to auscultation, no w/r/r  CARDIO: RRR without murmur, peripheral pulses intact  GI: good BS's,no masses, HSM or tenderness  EXTREMITIES: FROM of all joints    ASSESSMENT AND PLAN:     Encounter Diagnosis   Name Primary?    Dermatitis Yes     No orders of the defined types were placed in this encounter.    Meds & Refills for this Visit:  Requested Prescriptions     Signed Prescriptions Disp Refills    triamcinolone 0.5 % External Cream 15 g 0     Sig: Apply 1 Application topically 3 (three) times daily.     Imaging & Consults:  None  No follow-ups on file.  Patient Instructions   I discussed possible causes and contributing factors.  Discussed management strategies  Recommend cooler baths or showers, pat dry, avoid rubbing, cut nails short, may use diphenhydramine 25 mg at bedtime and loratadine 10 mg in the morning for itching, may use ice massage for itching  Triamcinolone 0.5% cream 3 times daily to affected areas till clear  Call or follow-up if no significant  improvement over the next 7 to 10 days  The patient indicates understanding of these issues and agrees to the plan.    Jason Parmar DO, FAAFP

## 2024-07-09 ENCOUNTER — HOSPITAL ENCOUNTER (OUTPATIENT)
Age: 59
Discharge: HOME | End: 2024-07-09
Payer: COMMERCIAL

## 2024-07-09 ENCOUNTER — APPOINTMENT (OUTPATIENT)
Dept: GENERAL RADIOLOGY | Age: 59
End: 2024-07-09
Attending: NURSE PRACTITIONER
Payer: COMMERCIAL

## 2024-07-09 VITALS
OXYGEN SATURATION: 99 % | HEIGHT: 69 IN | DIASTOLIC BLOOD PRESSURE: 79 MMHG | SYSTOLIC BLOOD PRESSURE: 113 MMHG | TEMPERATURE: 97 F | RESPIRATION RATE: 16 BRPM | BODY MASS INDEX: 23.7 KG/M2 | HEART RATE: 58 BPM | WEIGHT: 160 LBS

## 2024-07-09 DIAGNOSIS — M25.561 ACUTE PAIN OF RIGHT KNEE: Primary | ICD-10-CM

## 2024-07-09 PROCEDURE — A6448 LT COMPRES BAND <3"/YD: HCPCS | Performed by: NURSE PRACTITIONER

## 2024-07-09 PROCEDURE — 99213 OFFICE O/P EST LOW 20 MIN: CPT | Performed by: NURSE PRACTITIONER

## 2024-07-09 PROCEDURE — 73560 X-RAY EXAM OF KNEE 1 OR 2: CPT | Performed by: NURSE PRACTITIONER

## 2024-07-09 NOTE — ED PROVIDER NOTES
Patient Seen in: [facility]      History     Chief Complaint   Patient presents with    Knee Pain     Stated Complaint: right knee swollen    Subjective:   59-year-old female presents today with pain to the right knee with swelling.  Denies any known injuries.  Patient is a  and is on her feet a lot.  Denies any decreased range of motion.  No other symptoms or concerns.  The patient's medication list, past medical history and social history elements as listed in today's nurse's notes were reviewed and agreed (except as otherwise stated in the HPI).  The patient's family history reviewed and determined to be noncontributory to the presenting problem            Objective:   Past Medical History:    Allergic rhinitis    Benign cyst of right kidney    followed by Dr Deven Marie- [facility], [facility];e MRI 23    Hyperlipidemia    Hypothyroidism              Past Surgical History:   Procedure Laterality Date    Cholecystectomy      Colonoscopy            Other      sinus surgery    Sinus surgery                    Social History     Socioeconomic History    Marital status:    Tobacco Use    Smoking status: Never     Passive exposure: Never    Smokeless tobacco: Never   Vaping Use    Vaping status: Never Used   Substance and Sexual Activity    Alcohol use: Not Currently     Comment: none x 5 months    Drug use: Never   Other Topics Concern    Caffeine Concern No    Exercise No    Seat Belt No    Special Diet No    Stress Concern No    Weight Concern Yes     Comment: Would like to loose some weight     Social Determinants of Health      [facility]    Social Connections    Received from [facility], [facility]    Housing Stability              Review of Systems    Positive for stated Chief Complaint: Knee Pain    Other systems are as noted in HPI.  Constitutional and vital signs  reviewed.      All other systems reviewed and negative except as noted above.    Physical Exam     ED Triage Vitals [07/09/24 0849]   /79   Pulse 58   Resp 16   Temp 96.8 °F (36 °C)   Temp src Temporal   SpO2 99 %   O2 Device None (Room air)       Current Vitals:   Vital Signs  BP: 113/79  Pulse: 58  Resp: 16  Temp: 96.8 °F (36 °C)  Temp src: Temporal    Oxygen Therapy  SpO2: 99 %  O2 Device: None (Room air)            Physical Exam  Vitals and nursing note reviewed.   Constitutional:       Appearance: Normal appearance.   HENT:      Head: Normocephalic.      Mouth/Throat:      Mouth: Mucous membranes are moist.   Cardiovascular:      Rate and Rhythm: Normal rate.   Pulmonary:      Effort: Pulmonary effort is normal.   Musculoskeletal:      Comments: Pain with palpation to the anterior aspect of the right knee.  Bruising and swelling is noted.  Normal flexion extension.  CMS intact.   Skin:     General: Skin is warm and dry.   Neurological:      Mental Status: She is alert and oriented to person, place, and time.               ED Course   Labs Reviewed - No data to display          XR KNEE (1 OR 2 VIEWS), RIGHT (CPT=73560)    Result Date: 7/9/2024  PROCEDURE:  XR KNEE (1 OR 2 VIEWS), RIGHT (CPT=73560)  COMPARISON:  None.  INDICATIONS:  right knee swollen  PATIENT STATED HISTORY: (As transcribed by Technologist)  Patient states she developed pain and swelling to anterior right knee yesterday. Patient denies any injury.              CONCLUSION:    Anterior knee swelling at and below the patella.  No joint effusion.  No gas collection, foreign body or calcification.  Only trace patellofemoral degenerative changes with tiny osteophytes upper and lower pole lateral view.  No sign of joint narrowing.  No fracture dislocation.  LOCATION:  [facility]   Dictated by (CST): Tommie Sandoval MD on 7/09/2024 at 9:05 AM     Finalized by (CST): Tommie Sandoval MD on 7/09/2024 at 9:09 AM              MDM     Please note that this  report has been produced using speech recognition software and may contain errors related to that system including, but not limited to, errors in grammar, punctuation, and spelling, as well as words and phrases that possibly may have been recognized inappropriately.  If there are any questions or concerns, contact the dictating provider for clarification.        Note to patient: The 21st Century Cures Act makes medical notes like these available to patients in the interest of transparency. However, this is a medical document intended as peer to peer communication. It is written in medical language and may contain abbreviations or verbiage that are unfamiliar. It may appear blunt or direct. Medical documents are intended to carry relevant information, facts as evident, and the clinical opinion of the practitioner.                                   Medical Decision Making  Differential diagnosis includes but is not limited to: Knee-contusion, sprain, fracture, osteoarthritis      Presents today with complaints of pain and swelling to the right knee unknown injury however patient does have bruising to the anterior aspect of the knee with swelling.  X-ray however shows no fracture or acute findings.  RICE instructions given.  Ace wrap was applied with instruction.  To take over-the-counter NSAID and Tylenol for pain and swelling.  Encouraged follow with her primary care physician or orthopedics in 1 week if symptoms do not improve.  Patient verbalized understanding and agreed with plan of care.    Amount and/or Complexity of Data Reviewed  Radiology: ordered and independent interpretation performed. Decision-making details documented in ED Course.     Details: X-ray right knee    Risk  OTC drugs.        Disposition and Plan     Clinical Impression:  1. Acute pain of right knee         Disposition:  Discharge  7/9/2024  9:18 am    Follow-up:  Ady Adame PA  [address] [place] [address]  [id number]  [phone]    In 1 week  As needed          Medications Prescribed:  Current Discharge Medication List

## 2024-08-07 ENCOUNTER — LABORATORY ENCOUNTER (OUTPATIENT)
Dept: LAB | Age: 59
End: 2024-08-07
Attending: FAMILY MEDICINE
Payer: COMMERCIAL

## 2024-08-07 DIAGNOSIS — I25.10 ATHEROSCLEROSIS OF NATIVE CORONARY ARTERY OF NATIVE HEART WITHOUT ANGINA PECTORIS: ICD-10-CM

## 2024-08-07 DIAGNOSIS — Z79.899 ENCOUNTER FOR LONG-TERM (CURRENT) DRUG USE: ICD-10-CM

## 2024-08-07 DIAGNOSIS — Z00.00 PREVENTATIVE HEALTH CARE: ICD-10-CM

## 2024-08-07 LAB
ALBUMIN SERPL-MCNC: 4.8 G/DL (ref 3.2–4.8)
ALBUMIN/GLOB SERPL: 1.4 {RATIO} (ref 1–2)
ALP LIVER SERPL-CCNC: 53 U/L
ALT SERPL-CCNC: 17 U/L
ANION GAP SERPL CALC-SCNC: 5 MMOL/L (ref 0–18)
AST SERPL-CCNC: 19 U/L (ref ?–34)
BILIRUB SERPL-MCNC: 0.6 MG/DL (ref 0.3–1.2)
BUN BLD-MCNC: 11 MG/DL (ref 9–23)
CALCIUM BLD-MCNC: 10.3 MG/DL (ref 8.7–10.4)
CHLORIDE SERPL-SCNC: 106 MMOL/L (ref 98–112)
CHOLEST SERPL-MCNC: 147 MG/DL (ref ?–200)
CO2 SERPL-SCNC: 27 MMOL/L (ref 21–32)
CREAT BLD-MCNC: 0.82 MG/DL
EGFRCR SERPLBLD CKD-EPI 2021: 83 ML/MIN/1.73M2 (ref 60–?)
GLOBULIN PLAS-MCNC: 3.4 G/DL (ref 2–3.5)
GLUCOSE BLD-MCNC: 81 MG/DL (ref 70–99)
HDLC SERPL-MCNC: 78 MG/DL (ref 40–59)
LDLC SERPL CALC-MCNC: 57 MG/DL (ref ?–100)
NONHDLC SERPL-MCNC: 69 MG/DL (ref ?–130)
OSMOLALITY SERPL CALC.SUM OF ELEC: 284 MOSM/KG (ref 275–295)
POTASSIUM SERPL-SCNC: 4 MMOL/L (ref 3.5–5.1)
PROT SERPL-MCNC: 8.2 G/DL (ref 5.7–8.2)
SODIUM SERPL-SCNC: 138 MMOL/L (ref 136–145)
T4 FREE SERPL-MCNC: 1.3 NG/DL (ref 0.8–1.7)
TRIGL SERPL-MCNC: 58 MG/DL (ref 30–149)
TSI SER-ACNC: 0.68 MIU/ML (ref 0.55–4.78)
VLDLC SERPL CALC-MCNC: 8 MG/DL (ref 0–30)

## 2024-08-07 PROCEDURE — 80061 LIPID PANEL: CPT | Performed by: FAMILY MEDICINE

## 2024-08-07 PROCEDURE — 84439 ASSAY OF FREE THYROXINE: CPT | Performed by: FAMILY MEDICINE

## 2024-08-07 PROCEDURE — 80053 COMPREHEN METABOLIC PANEL: CPT | Performed by: FAMILY MEDICINE

## 2024-08-07 PROCEDURE — 84443 ASSAY THYROID STIM HORMONE: CPT | Performed by: FAMILY MEDICINE

## 2024-08-08 DIAGNOSIS — Z79.899 ENCOUNTER FOR LONG-TERM (CURRENT) DRUG USE: ICD-10-CM

## 2024-08-08 DIAGNOSIS — E03.9 ACQUIRED HYPOTHYROIDISM: Primary | ICD-10-CM

## 2024-08-08 DIAGNOSIS — E78.00 ELEVATED CHOLESTEROL: ICD-10-CM

## 2024-08-14 RX ORDER — VALACYCLOVIR HYDROCHLORIDE 1 G/1
1000 TABLET, FILM COATED ORAL DAILY
Qty: 10 TABLET | Refills: 0 | Status: SHIPPED | OUTPATIENT
Start: 2024-08-14

## 2024-08-16 ENCOUNTER — PATIENT MESSAGE (OUTPATIENT)
Dept: FAMILY MEDICINE CLINIC | Facility: CLINIC | Age: 59
End: 2024-08-16

## 2024-08-16 NOTE — TELEPHONE ENCOUNTER
Patient states she takes the Valacyclovir for cold sores on her lips. She states she gets them all the time. She will take it twice a day.

## 2024-08-16 NOTE — TELEPHONE ENCOUNTER
From: Mi Hernández  Sent: 8/16/2024 1:11 PM CDT  To: Abbe Humphries Clinical Staff  Subject: cold sores    Only for cold sores

## 2024-09-13 RX ORDER — ROSUVASTATIN CALCIUM 20 MG/1
20 TABLET, COATED ORAL NIGHTLY
Qty: 90 TABLET | Refills: 2 | Status: SHIPPED | OUTPATIENT
Start: 2024-09-13

## 2024-10-03 RX ORDER — LEVOTHYROXINE SODIUM 112 UG/1
112 TABLET ORAL
Qty: 90 TABLET | Refills: 3 | Status: SHIPPED | OUTPATIENT
Start: 2024-10-03

## 2024-10-03 NOTE — TELEPHONE ENCOUNTER
Thyroid Medication Protocol Zllohk71/03/2024 12:41 AM   Protocol Details TSH in past 12 months    Last TSH value is normal    In person appointment or virtual visit in the past 12 mos or appointment in next 3 mos       Last office visit:  07/02/24  Last refill:  03/23/24  #90, 1 refill  Last tsh:  08/07/24    No future appointments.

## 2024-11-01 ENCOUNTER — NURSE TRIAGE (OUTPATIENT)
Dept: FAMILY MEDICINE CLINIC | Facility: CLINIC | Age: 59
End: 2024-11-01

## 2024-11-01 NOTE — TELEPHONE ENCOUNTER
Spoke with patient who states she has had left upper abdominal pain since Sunday. Abdomin feels hard. Pain is consistent. Occasionally radiates the left side. No radiation at this time. States she has a history of this. Last episode was several months ago. States an MRI was done and nothing was found. Advised patient she should be seen at urgent care today where they can do imaging, but she states she can't go directly without a referral with her insurance. Advised to keep appointment tomorrow, but if symptoms worsen then to go to ER for evaluation. Verbal report given to Luis. Advised patient that if luis feels further imaging or a higher level of care is needed, then she will recommend ER for evaluation. Patient verbalized understanding.   Answer Assessment - Initial Assessment Questions  1. LOCATION: \"Where does it hurt?\"       Left upper abdominal pain  2. RADIATION: \"Does the pain shoot anywhere else?\" (e.g., chest, back)      Sometimes to the left side  3. ONSET: \"When did the pain begin?\" (e.g., minutes, hours or days ago)       Sunday  4. SUDDEN: \"Gradual or sudden onset?\"      Sudden, but has had this in the past. Has had MRI's.   5. PATTERN \"Does the pain come and go, or is it constant?\"     - If it comes and goes: \"How long does it last?\" \"Do you have pain now?\"      (Note: Comes and goes means the pain is intermittent. It goes away completely between bouts.)     - If constant: \"Is it getting better, staying the same, or getting worse?\"       (Note: Constant means the pain never goes away completely; most serious pain is constant and gets worse.)       Constant and staying the same  6. SEVERITY: \"How bad is the pain?\"  (e.g., Scale 1-10; mild, moderate, or severe)     - MILD (1-3): Doesn't interfere with normal activities, abdomen soft and not tender to touch.      - MODERATE (4-7): Interferes with normal activities or awakens from sleep, abdomen tender to touch.      - SEVERE (8-10): Excruciating  pain, doubled over, unable to do any normal activities.        5 on pain scale. Abdomin feels hard and distended  7. RECURRENT SYMPTOM: \"Have you ever had this type of stomach pain before?\" If Yes, ask: \"When was the last time?\" and \"What happened that time?\"       Yes, several months ago  8. CAUSE: \"What do you think is causing the stomach pain?\"      unsure  9. RELIEVING/AGGRAVATING FACTORS: \"What makes it better or worse?\" (e.g., antacids, bending or twisting motion, bowel movement)      Nothing makes it feel better or worse  10. OTHER SYMPTOMS: \"Do you have any other symptoms?\" (e.g., back pain, diarrhea, fever, urination pain, vomiting)        Diarrhea  11. PREGNANCY: \"Is there any chance you are pregnant?\" \"When was your last menstrual period?\"        N/a    Protocols used: Abdominal Pain - Female-A-[place]

## 2024-11-01 NOTE — TELEPHONE ENCOUNTER
Has appointment 11/02/24 with Payton Mckeon Saturday, triaging with symptoms bad stomach pain for 1 week, loose stools occasionlly, feels bloated with sharp pain on left middle side, able to keep food down.

## 2024-11-02 ENCOUNTER — OFFICE VISIT (OUTPATIENT)
Dept: FAMILY MEDICINE CLINIC | Facility: CLINIC | Age: 59
End: 2024-11-02
Payer: COMMERCIAL

## 2024-11-02 VITALS
HEIGHT: 69 IN | WEIGHT: 157 LBS | OXYGEN SATURATION: 98 % | BODY MASS INDEX: 23.25 KG/M2 | DIASTOLIC BLOOD PRESSURE: 74 MMHG | HEART RATE: 70 BPM | SYSTOLIC BLOOD PRESSURE: 120 MMHG | TEMPERATURE: 99 F

## 2024-11-02 DIAGNOSIS — R10.32 INTERMITTENT LEFT LOWER QUADRANT ABDOMINAL PAIN: Primary | ICD-10-CM

## 2024-11-02 DIAGNOSIS — R19.7 DIARRHEA, UNSPECIFIED TYPE: ICD-10-CM

## 2024-11-02 LAB
BASOPHILS # BLD AUTO: 0.09 X10(3) UL (ref 0–0.2)
BASOPHILS NFR BLD AUTO: 1.7 %
CRP SERPL-MCNC: <0.4 MG/DL (ref ?–0.5)
EOSINOPHIL # BLD AUTO: 0.19 X10(3) UL (ref 0–0.7)
EOSINOPHIL NFR BLD AUTO: 3.7 %
ERYTHROCYTE [DISTWIDTH] IN BLOOD BY AUTOMATED COUNT: 11.5 %
ERYTHROCYTE [SEDIMENTATION RATE] IN BLOOD: 56 MM/HR
HCT VFR BLD AUTO: 37.7 %
HGB BLD-MCNC: 13.4 G/DL
IMM GRANULOCYTES # BLD AUTO: 0.02 X10(3) UL (ref 0–1)
IMM GRANULOCYTES NFR BLD: 0.4 %
LYMPHOCYTES # BLD AUTO: 1.56 X10(3) UL (ref 1–4)
LYMPHOCYTES NFR BLD AUTO: 30 %
MCH RBC QN AUTO: 32.1 PG (ref 26–34)
MCHC RBC AUTO-ENTMCNC: 35.5 G/DL (ref 31–37)
MCV RBC AUTO: 90.2 FL
MONOCYTES # BLD AUTO: 0.47 X10(3) UL (ref 0.1–1)
MONOCYTES NFR BLD AUTO: 9 %
NEUTROPHILS # BLD AUTO: 2.87 X10 (3) UL (ref 1.5–7.7)
NEUTROPHILS # BLD AUTO: 2.87 X10(3) UL (ref 1.5–7.7)
NEUTROPHILS NFR BLD AUTO: 55.2 %
PLATELET # BLD AUTO: 310 10(3)UL (ref 150–450)
RBC # BLD AUTO: 4.18 X10(6)UL
WBC # BLD AUTO: 5.2 X10(3) UL (ref 4–11)

## 2024-11-02 PROCEDURE — 99213 OFFICE O/P EST LOW 20 MIN: CPT

## 2024-11-02 PROCEDURE — 3008F BODY MASS INDEX DOCD: CPT

## 2024-11-02 PROCEDURE — 85652 RBC SED RATE AUTOMATED: CPT

## 2024-11-02 PROCEDURE — 85025 COMPLETE CBC W/AUTO DIFF WBC: CPT

## 2024-11-02 PROCEDURE — 86140 C-REACTIVE PROTEIN: CPT

## 2024-11-02 PROCEDURE — 3078F DIAST BP <80 MM HG: CPT

## 2024-11-02 PROCEDURE — 3074F SYST BP LT 130 MM HG: CPT

## 2024-11-02 NOTE — PROGRESS NOTES
HPI:   Mi is a 59 yr. Old female here today for left mid to lower quadrant abdominal pain.  Patient reports symptoms started on  with pain, diarrhea, and decreased appetite.  Pain has gotten slightly better now a 4-5/10, diarrhea is intermittent non yesterday, had episode this morning after finished her coffee.  Had similar episodes in past, normal CT abdomen and normal colonoscopy.  Patient has not made any dietary changes, no medications.          Current Outpatient Medications   Medication Sig Dispense Refill    levothyroxine 112 MCG Oral Tab TAKE 1 TABLET BY MOUTH EVERY DAY BEFORE BREAKFAST 90 tablet 3    rosuvastatin 20 MG Oral Tab TAKE 1 TABLET BY MOUTH EVERY DAY AT NIGHT 90 tablet 2    RYALTRIS 665-25 MCG/ACT Nasal Suspension 2 sprays by Nasal route daily.      levocetirizine 5 MG Oral Tab Take 1 tablet (5 mg total) by mouth every evening.      valACYclovir 1 G Oral Tab Take 1 tablet (1,000 mg total) by mouth daily. (Patient not taking: Reported on 2024) 10 tablet 0      Past Medical History:    Allergic rhinitis    Benign cyst of right kidney    followed by Dr Deven Marie- [facility], [facility];e MRI 23    Hyperlipidemia    Hypothyroidism      Past Surgical History:   Procedure Laterality Date    Cholecystectomy      Colonoscopy            Other      sinus surgery    Sinus surgery          Family History   Problem Relation Age of Onset    Lipids Father     Heart Disorder Father     Asthma Father     Lipids Mother     Heart Disorder Mother     Stroke Mother     Stroke Paternal Grandfather     Heart Disorder Sister 48        MI    Heart Disease Sister     Other (Other) Sister         Hashimoto's    Heart Disorder Brother 54        PTCA    Cancer Neg       Social History     Socioeconomic History    Marital status:    Tobacco Use    Smoking status: Never     Passive exposure: Never    Smokeless tobacco: Never   Vaping Use    Vaping status: Never Used   Substance and Sexual  Activity    Alcohol use: Not Currently     Comment: none x 5 months    Drug use: Never   Other Topics Concern    Caffeine Concern No    Exercise No    Seat Belt No    Special Diet No    Stress Concern No    Weight Concern Yes     Comment: Would like to loose some weight     Social Drivers of Health      [facility], [facility]    Social Connections    Received from [facility], [facility]    Housing Stability         REVIEW OF SYSTEMS:   Review of Systems   Constitutional:  Positive for appetite change. Negative for chills and fever.   HENT: Negative.     Respiratory: Negative.     Cardiovascular: Negative.    Gastrointestinal:  Positive for abdominal pain (see hpi) and diarrhea. Negative for blood in stool, constipation, nausea, rectal pain and vomiting.   Genitourinary:  Negative for dysuria, frequency and urgency.   Musculoskeletal:  Negative for back pain.   Neurological:  Negative for dizziness, light-headedness and headaches.       EXAM:   /74   Pulse 70   Temp 98.5 °F (36.9 °C) (Temporal)   Ht 5' 9\" (1.753 m)   Wt 157 lb (71.2 kg)   LMP 02/17/2015   SpO2 98%   BMI 23.18 kg/m²   Physical Exam  Vitals and nursing note reviewed.   Constitutional:       General: She is not in acute distress.     Appearance: Normal appearance. She is not ill-appearing.   HENT:      Head: Atraumatic.      Mouth/Throat:      Mouth: Mucous membranes are moist.   Cardiovascular:      Rate and Rhythm: Normal rate and regular rhythm.      Heart sounds: Normal heart sounds.   Pulmonary:      Effort: Pulmonary effort is normal.      Breath sounds: Normal breath sounds.   Abdominal:      General: Abdomen is flat. Bowel sounds are normal. There is no distension.      Palpations: Abdomen is soft. There is no mass.      Tenderness: There is abdominal tenderness (slight to palpation of left mid to lower quadrant). There is no guarding or  rebound.   Musculoskeletal:      Cervical back: Neck supple.   Neurological:      Mental Status: She is alert and oriented to person, place, and time.         ASSESSMENT AND PLAN:   Diagnoses and all orders for this visit:    Intermittent left lower quadrant abdominal pain  -     CBC W Differential W Platelet [E]; Future  -     Sed Rate, Westergren (Automated) [E]; Future  -     C-Reactive Protein [E]; Future    Diarrhea, unspecified type  -     CBC W Differential W Platelet [E]; Future  -     Sed Rate, Westergren (Automated) [E]; Future  -     C-Reactive Protein [E]; Future     -Discussed labs today.  DDX considered including diverticulitis, IBD, acute abdomen, gastroenteritis. Recommend BRAT diet, push fluids. Symptoms that warrant emergency room evaluation.  -Follow up with pcp for persistent or worsening symptoms. Call with questions.  -Patient verbalized understanding and in agreement with treatment plan.    RAFA Reis

## 2024-11-20 ENCOUNTER — HOSPITAL ENCOUNTER (OUTPATIENT)
Dept: GENERAL RADIOLOGY | Age: 59
Discharge: HOME | End: 2024-11-20
Attending: FAMILY MEDICINE
Payer: COMMERCIAL

## 2024-11-20 ENCOUNTER — OFFICE VISIT (OUTPATIENT)
Dept: FAMILY MEDICINE CLINIC | Facility: CLINIC | Age: 59
End: 2024-11-20
Payer: COMMERCIAL

## 2024-11-20 VITALS
TEMPERATURE: 98 F | OXYGEN SATURATION: 100 % | DIASTOLIC BLOOD PRESSURE: 74 MMHG | RESPIRATION RATE: 18 BRPM | HEIGHT: 69 IN | HEART RATE: 60 BPM | WEIGHT: 156.63 LBS | SYSTOLIC BLOOD PRESSURE: 116 MMHG | BODY MASS INDEX: 23.2 KG/M2

## 2024-11-20 DIAGNOSIS — Z12.11 SCREEN FOR COLON CANCER: ICD-10-CM

## 2024-11-20 DIAGNOSIS — R10.12 LEFT UPPER QUADRANT ABDOMINAL PAIN: Primary | ICD-10-CM

## 2024-11-20 DIAGNOSIS — K59.01 SLOW TRANSIT CONSTIPATION: ICD-10-CM

## 2024-11-20 DIAGNOSIS — R10.12 LEFT UPPER QUADRANT ABDOMINAL PAIN: ICD-10-CM

## 2024-11-20 PROCEDURE — 3074F SYST BP LT 130 MM HG: CPT | Performed by: FAMILY MEDICINE

## 2024-11-20 PROCEDURE — 3008F BODY MASS INDEX DOCD: CPT | Performed by: FAMILY MEDICINE

## 2024-11-20 PROCEDURE — 74018 RADEX ABDOMEN 1 VIEW: CPT | Performed by: FAMILY MEDICINE

## 2024-11-20 PROCEDURE — 3078F DIAST BP <80 MM HG: CPT | Performed by: FAMILY MEDICINE

## 2024-11-20 PROCEDURE — 99214 OFFICE O/P EST MOD 30 MIN: CPT | Performed by: FAMILY MEDICINE

## 2024-11-20 NOTE — PATIENT INSTRUCTIONS
I reviewed possible diagnoses and contributing factors.  I reviewed prior MRI.  Reviewed results of KUB.  I advised patient that I strongly suspect her abdominal discomfort is from intermittent constipation.  Would recommend magnesium glycinate nightly, MiraLAX 1 capful in 8 ounces of fluids every morning and a fiber supplement daily as well.  Discussed dietary modification  Call or follow-up if no significant improvement over the next 1 to 2 weeks  Would recommend follow-up screening colonoscopy for 10-year surveillance

## 2024-11-20 NOTE — PROGRESS NOTES
Mi Hernández is a 59 year old female.  Chief Complaint   Patient presents with    Stomach Pain     Since November 2nd along with diarrhea       HPI:   10/27 diarrhea and stomach pains, bland diet, better, LUQ pain present now, patient reports bowel movement this morning, small hard pellets  Loose stools, no fever, appetite a little less  Food may make it worse, bowel movements did not relieve the pain.  No recent travel, abx, no pets  Pt reports she had this problem off and on x 10 yrs    Patient was seen on 11/2 with complaints of left lower quadrant pain.  CT scan of abdomen pelvis 9/22/2022 for similar abdominal complaints  MRI of abdomen and pelvis 2/20/2023  FINDINGS: The visualized lung bases are unremarkable in appearance.   No focal hepatic lesions are identified.   Patient is status post cholecystectomy.   There is mild extrahepatic biliary ductal dilatation with the common bile duct measuring 9 mm. This extends to the level of the ampulla.   Pancreatic duct is normal caliber. No focal pancreatic lesions are identified.   The spleen and adrenal glands are unremarkable.   Again identified is a mildly lobulated T2 hyperintense lesion within the right kidney which measures 1.3 cm. As best demonstrated on the coronal images, this does have an internal septation but perceptible enhancement. 2 adjacent cysts are considered less likely. The overall appearance is not significantly changed when compared to the prior exam. There is a tiny adjacent 2 mm T2 hyperintense lesion immediately adjacent to this larger cyst. Other smaller T2 hyperintense lesions within both kidneys are too small to characterize but most likely represent cysts.   No definite enhancement is identified within many of these lesions on postcontrast imaging. The kidneys demonstrate symmetric enhancement. There is no hydronephrosis.   Visualized bowel is unremarkable in appearance.   There is a mild curvature to the spine. There is no acute  osseous abnormality.     IMPRESSION:   1. No significant interval change of the referenced 1.3 cm septated cyst within the right kidney. Given the stability over 6 months, continued 6 month follow-up could be performed. Given its minimally smooth thickened internal septation, this is considered Bosniak 2F.   2. Mild extrahepatic biliary ductal dilatation most likely related to the cholecystectomy state.   3. Other subcentimeter T2 hyperintense lesions within both kidneys most likely represent cysts. However, these are too small to fully characterize.   Current Outpatient Medications   Medication Sig Dispense Refill    levothyroxine 112 MCG Oral Tab TAKE 1 TABLET BY MOUTH EVERY DAY BEFORE BREAKFAST 90 tablet 3    rosuvastatin 20 MG Oral Tab TAKE 1 TABLET BY MOUTH EVERY DAY AT NIGHT 90 tablet 2    valACYclovir 1 G Oral Tab Take 1 tablet (1,000 mg total) by mouth daily. 10 tablet 0    RYALTRIS 665-25 MCG/ACT Nasal Suspension 2 sprays by Nasal route daily.      levocetirizine 5 MG Oral Tab Take 1 tablet (5 mg total) by mouth every evening.        Past Medical History:    Allergic rhinitis    Benign cyst of right kidney    followed by Dr Deven Marie- [facility] [place];e MRI [facility]    Hyperlipidemia    Hypothyroidism      Past Surgical History:   Procedure Laterality Date    Cholecystectomy      Colonoscopy            Other      sinus surgery    Sinus surgery           Social History     Socioeconomic History    Marital status:    Tobacco Use    Smoking status: Never     Passive exposure: Never    Smokeless tobacco: Never   Vaping Use    Vaping status: Never Used   Substance and Sexual Activity    Alcohol use: Not Currently     Comment: none x 5 months    Drug use: Never   Other Topics Concern    Caffeine Concern No    Exercise No    Seat Belt No    Special Diet No    Stress Concern No    Weight Concern Yes     Comment: Would like to loose some weight     Social Drivers of Health      Received from  [facility], [facility]    Social Connections    Received from [facility], [facility]    Housing Stability        REVIEW OF SYSTEMS:   GENERAL HEALTH: feels well otherwise, denies fatigue, appetite ok  SKIN: denies any unusual skin lesions or rashes  ENT: denies nasal congestion, pnd, sore throat, ear pain or pressure, decreased hearing  RESPIRATORY: denies shortness of breath with exertion,cough, wheezing  CARDIOVASCULAR: denies chest pain on exertion, edema  GI: see hpi  NEURO: denies headaches  PSYCH: denies feeling stressed or anxious    EXAM:   /74 (BP Location: Left arm, Patient Position: Sitting, Cuff Size: adult)   Pulse 60   Temp 97.6 °F (36.4 °C) (Temporal)   Resp 18   Ht 5' 9\" (1.753 m)   Wt 156 lb 9.6 oz (71 kg)   LMP 02/17/2015   SpO2 100%   BMI 23.13 kg/m²   GENERAL: well developed, well nourished,in no apparent distress, well hydrated  SKIN: no rashes,no suspicious lesions  ENT: TMs: intact, good mobility, Nose: turbinates clear, no dc, Throat: no erythema, pnd, or lesions, oral mucosa  NECK: supple,no adenopathy,no bruits, no thyromegaly  LUNGS: clear to auscultation, no w/r/r  CARDIO: RRR without murmur, peripheral pulses intact  GI: good BS's, HSM, +mild discomfort ALANA, ? Palpable mass ? Stool, no R/R/G   EXTREMITIES: FROM of all joints  KUB: Large amount of retained stool in the descending colon.  Normal bowel gas up until splenic flexure  ASSESSMENT AND PLAN:     Encounter Diagnoses   Name Primary?    Left upper quadrant abdominal pain Yes    Screen for colon cancer     Slow transit constipation      No orders of the defined types were placed in this encounter.    Meds & Refills for this Visit:  Requested Prescriptions      No prescriptions requested or ordered in this encounter     Imaging & Consults:  GASTRO - EXTERNAL  No follow-ups on file.  Patient Instructions   I reviewed possible diagnoses and  contributing factors.  I reviewed prior MRI.  Reviewed results of KUB.  I advised patient that I strongly suspect her abdominal discomfort is from intermittent constipation.  Would recommend magnesium glycinate nightly, MiraLAX 1 capful in 8 ounces of fluids every morning and a fiber supplement daily as well.  Discussed dietary modification  Call or follow-up if no significant improvement over the next 1 to 2 weeks  Would recommend follow-up screening colonoscopy for 10-year surveillance  The patient indicates understanding of these issues and agrees to the plan.    Jason Parmar DO, FAAFP

## 2025-01-25 ENCOUNTER — HOSPITAL ENCOUNTER (OUTPATIENT)
Age: 60
Discharge: HOME | End: 2025-01-25
Payer: COMMERCIAL

## 2025-01-25 VITALS
SYSTOLIC BLOOD PRESSURE: 126 MMHG | RESPIRATION RATE: 16 BRPM | HEIGHT: 69 IN | TEMPERATURE: 98 F | WEIGHT: 150 LBS | DIASTOLIC BLOOD PRESSURE: 83 MMHG | OXYGEN SATURATION: 100 % | HEART RATE: 63 BPM | BODY MASS INDEX: 22.22 KG/M2

## 2025-01-25 DIAGNOSIS — J01.00 ACUTE NON-RECURRENT MAXILLARY SINUSITIS: Primary | ICD-10-CM

## 2025-01-25 LAB
POCT INFLUENZA A: NEGATIVE
POCT INFLUENZA B: NEGATIVE
SARS-COV-2 RNA RESP QL NAA+PROBE: NOT DETECTED

## 2025-01-25 NOTE — DISCHARGE INSTRUCTIONS
Augmentin as directed.  Continue Shanna-[place], Flonase and nasal saline washes.    Follow up with your primary doctor.     If you have new, changing or worsening symptoms, please go directly to the ER.   no concerns

## 2025-01-25 NOTE — ED PROVIDER NOTES
Patient Seen in: [facility]      History     Chief Complaint   Patient presents with    Sinus Problem     Stated Complaint: sinus pain and pressure    Subjective:   HPI      CHIEF COMPLAINT: Sinus pain and pressure     HISTORY OF PRESENT ILLNESS: Patient is a 59-year-old female presenting for evaluation of a week of sinus pain and pressure.  States she has been doing over-the-counter Shanna-[place] which helps temporarily, allowing her to fall asleep but she has been feeling worse as the days go on.  No fever or chills.  Denies any cough, states occasionally she has throat clearing from postnasal drip.  No chest pain or shortness of breath.  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     REVIEW OF SYSTEMS:  Constitutional: no fever, no chills  Eyes: no discharge  ENT: As above  Cardiovascular: no chest pain, no palpitations  Respiratory: no cough, no shortness of breath  Gastrointestinal: no abdominal pain, no vomiting  Genitourinary: no hematuria  Musculoskeletal: no back pain  Skin: no rashes  Neurological: no headache     Otherwise a complete review of systems was obtained and other than the HPI was negative     The patient's medication list, past medical history and social history elements is as listed in today's nurse's notes are reviewed and agree. The patient's family history is reviewed and is noncontributory to the presenting problem, except as indicated as above.    Objective:     Past Medical History:    Allergic rhinitis    Benign cyst of right kidney    followed by Dr Deven Marie- [facility], [facility];e MRI 23    Hyperlipidemia    Hypothyroidism              Past Surgical History:   Procedure Laterality Date    Cholecystectomy      Colonoscopy            Other      sinus surgery    Sinus surgery                    No pertinent social history.            Review of Systems    Positive for stated complaint: sinus pain and pressure  Other systems are as noted in  HPI.  Constitutional and vital signs reviewed.      All other systems reviewed and negative except as noted above.    Physical Exam     ED Triage Vitals [01/25/25 1227]   /83   Pulse 63   Resp 16   Temp 98.1 °F (36.7 °C)   Temp src Oral   SpO2 100 %   O2 Device None (Room air)       Current Vitals:   Vital Signs  BP: 126/83  Pulse: 63  Resp: 16  Temp: 98.1 °F (36.7 °C)  Temp src: Oral    Oxygen Therapy  SpO2: 100 %  O2 Device: None (Room air)        Physical Exam  Vital signs and nursing notes reviewed  General Appearance: Patient is alert and oriented x4 in no acute distress  Eyes: pupils equal and round, reactive to light. No pallor or injection, no sclera icterus  Ears: Bilateral TMs and canals clear  Tenderness to percussion of the bilateral maxillary sinuses, boggy nasal turbinates bilaterally.  Throat: There is no erythema or exudates, no tonsillar hypertrophy.  no trismus or stridor. Uvula midline. No phonation changes, patient handling secretions well. Mucous membranes moist.  Respiratory: there are no retractions, lungs are clear to auscultation  Cardiovascular: regular rate and rhythm  Neurological:  Grossly intact, no deficits.  Gait normal.  Skin:  warm and dry, no rashes.  No jaundice. Brisk capillary refill  Musculoskeletal: neck is supple, no lymphadenopathy, non tender. no meningeal signs.  Extremities are symmetrical, full range of motion  Psychiatric: calm and cooperative      ED Course     Labs Reviewed   POCT FLU TEST - Normal    Narrative:     This assay is a rapid molecular in vitro test utilizing nucleic acid amplification of influenza A and B viral RNA.   RAPID SARS-COV-2 BY PCR - Normal            Differential diagnosis is viral URI such as COVID or flu, acute bacterial rhinosinusitis       MDM      This is a well-appearing 59-year-old female presenting for evaluation of sinus pressure for a week.  SARS-CoV-2 testing negative, influenza testing negative.  Will treat with Augmentin  twice daily for the next 5 days.  Patient can continue Shanna-[place], Flonase and her nasal saline washes.  Close follow-up with primary care.  If there are any new, changing or worsening symptoms go to the ER.  Patient voiced understanding to the treatment plan.  All questions answered        Medical Decision Making  Amount and/or Complexity of Data Reviewed  Labs: ordered. Decision-making details documented in ED Course.    Risk  OTC drugs.  Prescription drug management.        Disposition and Plan     Clinical Impression:  1. Acute non-recurrent maxillary sinusitis         Disposition:  Discharge  1/25/2025  1:26 pm    Follow-up:  Jason Parmar, DO  [address] [facility] [address]  [phone]    In 1 week  If symptoms worsen          Medications Prescribed:  Current Discharge Medication List        START taking these medications    Details   amoxicillin clavulanate 875-125 MG Oral Tab Take 1 tablet by mouth 2 (two) times daily for 5 days.  Qty: 10 tablet, Refills: 0                 Supplementary Documentation:

## 2025-01-25 NOTE — ED INITIAL ASSESSMENT (HPI)
Patient here with c/o sinus pain and pressure since Tuesday and getting worse. Denies fever, chills or body aches.

## 2025-02-17 ENCOUNTER — LABORATORY ENCOUNTER (OUTPATIENT)
Dept: LAB | Age: 60
End: 2025-02-17
Payer: COMMERCIAL

## 2025-02-17 ENCOUNTER — OFFICE VISIT (OUTPATIENT)
Dept: FAMILY MEDICINE CLINIC | Facility: CLINIC | Age: 60
End: 2025-02-17
Payer: COMMERCIAL

## 2025-02-17 VITALS
OXYGEN SATURATION: 99 % | BODY MASS INDEX: 22.13 KG/M2 | HEART RATE: 94 BPM | TEMPERATURE: 98 F | WEIGHT: 149.38 LBS | DIASTOLIC BLOOD PRESSURE: 80 MMHG | HEIGHT: 69 IN | RESPIRATION RATE: 19 BRPM | SYSTOLIC BLOOD PRESSURE: 120 MMHG

## 2025-02-17 DIAGNOSIS — R73.09 ELEVATED GLUCOSE: ICD-10-CM

## 2025-02-17 DIAGNOSIS — Z01.419 ENCOUNTER FOR WELL WOMAN EXAM: ICD-10-CM

## 2025-02-17 DIAGNOSIS — Z12.4 CERVICAL CANCER SCREENING: ICD-10-CM

## 2025-02-17 DIAGNOSIS — Z13.220 LIPID SCREENING: ICD-10-CM

## 2025-02-17 DIAGNOSIS — Z13.0 SCREENING FOR DEFICIENCY ANEMIA: ICD-10-CM

## 2025-02-17 DIAGNOSIS — Z13.29 THYROID DISORDER SCREEN: ICD-10-CM

## 2025-02-17 DIAGNOSIS — Z00.00 WELLNESS EXAMINATION: ICD-10-CM

## 2025-02-17 DIAGNOSIS — Z12.31 ENCOUNTER FOR SCREENING MAMMOGRAM FOR MALIGNANT NEOPLASM OF BREAST: ICD-10-CM

## 2025-02-17 DIAGNOSIS — Z00.00 WELLNESS EXAMINATION: Primary | ICD-10-CM

## 2025-02-17 LAB
ALBUMIN SERPL-MCNC: 4.6 G/DL (ref 3.2–4.8)
ALBUMIN/GLOB SERPL: 1.3 {RATIO} (ref 1–2)
ALP LIVER SERPL-CCNC: 47 U/L
ALT SERPL-CCNC: 16 U/L
ANION GAP SERPL CALC-SCNC: 9 MMOL/L (ref 0–18)
AST SERPL-CCNC: 15 U/L (ref ?–34)
BASOPHILS # BLD AUTO: 0.09 X10(3) UL (ref 0–0.2)
BASOPHILS NFR BLD AUTO: 1.2 %
BILIRUB SERPL-MCNC: 0.6 MG/DL (ref 0.3–1.2)
BUN BLD-MCNC: 15 MG/DL (ref 9–23)
CALCIUM BLD-MCNC: 9.7 MG/DL (ref 8.7–10.6)
CHLORIDE SERPL-SCNC: 102 MMOL/L (ref 98–112)
CHOLEST SERPL-MCNC: 163 MG/DL (ref ?–200)
CO2 SERPL-SCNC: 29 MMOL/L (ref 21–32)
CREAT BLD-MCNC: 0.89 MG/DL
EGFRCR SERPLBLD CKD-EPI 2021: 76 ML/MIN/1.73M2 (ref 60–?)
EOSINOPHIL # BLD AUTO: 0.17 X10(3) UL (ref 0–0.7)
EOSINOPHIL NFR BLD AUTO: 2.2 %
ERYTHROCYTE [DISTWIDTH] IN BLOOD BY AUTOMATED COUNT: 11.9 %
EST. AVERAGE GLUCOSE BLD GHB EST-MCNC: 114 MG/DL (ref 68–126)
FASTING PATIENT LIPID ANSWER: NO
FASTING STATUS PATIENT QL REPORTED: NO
GLOBULIN PLAS-MCNC: 3.6 G/DL (ref 2–3.5)
GLUCOSE BLD-MCNC: 91 MG/DL (ref 70–99)
HBA1C MFR BLD: 5.6 % (ref ?–5.7)
HCT VFR BLD AUTO: 38.2 %
HDLC SERPL-MCNC: 72 MG/DL (ref 40–59)
HGB BLD-MCNC: 12.6 G/DL
IMM GRANULOCYTES # BLD AUTO: 0.03 X10(3) UL (ref 0–1)
IMM GRANULOCYTES NFR BLD: 0.4 %
LDLC SERPL CALC-MCNC: 79 MG/DL (ref ?–100)
LYMPHOCYTES # BLD AUTO: 1.54 X10(3) UL (ref 1–4)
LYMPHOCYTES NFR BLD AUTO: 20.1 %
MCH RBC QN AUTO: 31.4 PG (ref 26–34)
MCHC RBC AUTO-ENTMCNC: 33 G/DL (ref 31–37)
MCV RBC AUTO: 95.3 FL
MONOCYTES # BLD AUTO: 0.76 X10(3) UL (ref 0.1–1)
MONOCYTES NFR BLD AUTO: 9.9 %
NEUTROPHILS # BLD AUTO: 5.07 X10 (3) UL (ref 1.5–7.7)
NEUTROPHILS # BLD AUTO: 5.07 X10(3) UL (ref 1.5–7.7)
NEUTROPHILS NFR BLD AUTO: 66.2 %
NONHDLC SERPL-MCNC: 91 MG/DL (ref ?–130)
OSMOLALITY SERPL CALC.SUM OF ELEC: 290 MOSM/KG (ref 275–295)
PLATELET # BLD AUTO: 347 10(3)UL (ref 150–450)
POTASSIUM SERPL-SCNC: 3.7 MMOL/L (ref 3.5–5.1)
PROT SERPL-MCNC: 8.2 G/DL (ref 5.7–8.2)
RBC # BLD AUTO: 4.01 X10(6)UL
SODIUM SERPL-SCNC: 140 MMOL/L (ref 136–145)
TRIGL SERPL-MCNC: 61 MG/DL (ref 30–149)
TSI SER-ACNC: 0.65 UIU/ML (ref 0.55–4.78)
VLDLC SERPL CALC-MCNC: 9 MG/DL (ref 0–30)
WBC # BLD AUTO: 7.7 X10(3) UL (ref 4–11)

## 2025-02-17 PROCEDURE — 83036 HEMOGLOBIN GLYCOSYLATED A1C: CPT

## 2025-02-17 PROCEDURE — 80050 GENERAL HEALTH PANEL: CPT

## 2025-02-17 PROCEDURE — 88175 CYTOPATH C/V AUTO FLUID REDO: CPT

## 2025-02-17 PROCEDURE — 80061 LIPID PANEL: CPT

## 2025-02-17 NOTE — PROGRESS NOTES
HPI:   Mi Hernández is a 59 year old female who presents for an Annual Health Visit.    She has IBS with constipation and diarrhea and will be having a  colonoscopy next month.     Allergies:   No Known Allergies    CURRENT MEDICATIONS   Current Outpatient Medications   Medication Sig Dispense Refill    levothyroxine 112 MCG Oral Tab TAKE 1 TABLET BY MOUTH EVERY DAY BEFORE BREAKFAST 90 tablet 3    rosuvastatin 20 MG Oral Tab TAKE 1 TABLET BY MOUTH EVERY DAY AT NIGHT 90 tablet 2    valACYclovir 1 G Oral Tab Take 1 tablet (1,000 mg total) by mouth daily. 10 tablet 0    RYALTRIS 665-25 MCG/ACT Nasal Suspension 2 sprays by Nasal route daily.      levocetirizine 5 MG Oral Tab Take 1 tablet (5 mg total) by mouth every evening.        HISTORICAL INFORMATION   Past Medical History:    Allergic rhinitis    Benign cyst of right kidney    followed by Dr Deven Marie- [facility], [facility];e MRI 23    Hyperlipidemia    Hypothyroidism      Past Surgical History:   Procedure Laterality Date    Cholecystectomy      Colonoscopy            Other      sinus surgery    Sinus surgery          Family History   Problem Relation Age of Onset    Lipids Father     Heart Disorder Father     Asthma Father     Lipids Mother     Heart Disorder Mother     Stroke Mother     Stroke Paternal Grandfather     Heart Disorder Sister 48        MI    Heart Disease Sister     Other (Other) Sister         Hashimoto's    Heart Disorder Brother 54        PTCA    Cancer Neg       SOCIAL HISTORY   Social History     Socioeconomic History    Marital status:    Tobacco Use    Smoking status: Never     Passive exposure: Never    Smokeless tobacco: Never   Vaping Use    Vaping status: Never Used   Substance and Sexual Activity    Alcohol use: Not Currently     Comment: none x 5 months    Drug use: Never   Other Topics Concern    Caffeine Concern No    Exercise No    Seat Belt No    Special Diet No    Stress Concern No    Weight Concern  Yes     Comment: Would like to loose some weight     Social Drivers of Health     Food Insecurity: No Food Insecurity (2/17/2025)    NCSS - Food Insecurity     Worried About Running Out of Food in the Last Year: No     Ran Out of Food in the Last Year: No   Transportation Needs: No Transportation Needs (2/17/2025)    NCSS - Transportation     Lack of Transportation: No   Housing Stability: Not At Risk (2/17/2025)    NCSS - Housing/Utilities     Has Housing: Yes     Worried About Losing Housing: No     Unable to Get Utilities: No     Social History     Social History Narrative    Not on file        REVIEW OF SYSTEMS:     Review of Systems   Constitutional:  Negative for activity change, appetite change and fatigue.   HENT:  Negative for congestion, hearing loss and sore throat.    Eyes:  Negative for discharge and redness.   Respiratory:  Negative for shortness of breath and wheezing.    Cardiovascular:  Negative for chest pain.   Gastrointestinal:  Positive for abdominal distention, abdominal pain, constipation and diarrhea.   Endocrine: Negative for cold intolerance and heat intolerance.   Genitourinary:  Negative for difficulty urinating and urgency.   Musculoskeletal:  Negative for arthralgias and back pain.   Skin:  Negative for color change.   Allergic/Immunologic: Negative for environmental allergies.   Neurological:  Positive for headaches. Negative for dizziness and syncope.   Hematological:  Negative for adenopathy. Does not bruise/bleed easily.   Psychiatric/Behavioral:  Negative for agitation, behavioral problems and confusion.        EXAM:   /80   Pulse 94   Temp 97.6 °F (36.4 °C) (Temporal)   Resp 19   Ht 5' 9\" (1.753 m)   Wt 149 lb 6.4 oz (67.8 kg)   LMP 02/17/2015   SpO2 99%   BMI 22.06 kg/m²    Wt Readings from Last 6 Encounters:   02/17/25 149 lb 6.4 oz (67.8 kg)   01/25/25 150 lb (68 kg)   11/20/24 156 lb 9.6 oz (71 kg)   11/02/24 157 lb (71.2 kg)   07/09/24 160 lb (72.6 kg)    07/02/24 163 lb 9.6 oz (74.2 kg)     Body mass index is 22.06 kg/m².    Physical Exam  Constitutional:       Appearance: Normal appearance. She is normal weight.   HENT:      Head: Normocephalic and atraumatic.      Nose: Nose normal.      Mouth/Throat:      Mouth: Mucous membranes are moist.      Pharynx: Oropharynx is clear.   Eyes:      Extraocular Movements: Extraocular movements intact.      Conjunctiva/sclera: Conjunctivae normal.      Pupils: Pupils are equal, round, and reactive to light.   Cardiovascular:      Rate and Rhythm: Normal rate and regular rhythm.   Pulmonary:      Effort: Pulmonary effort is normal.      Breath sounds: Normal breath sounds.   Abdominal:      Palpations: Abdomen is soft.   Genitourinary:     General: Normal vulva.      Rectum: Normal.   Musculoskeletal:         General: Normal range of motion.      Cervical back: Normal range of motion.   Skin:     General: Skin is warm and dry.      Capillary Refill: Capillary refill takes less than 2 seconds.   Neurological:      Mental Status: She is alert and oriented to person, place, and time.   Psychiatric:         Mood and Affect: Mood normal.         Behavior: Behavior normal.          ASSESSMENT AND PLAN:   Mi was seen today for physical.    Diagnoses and all orders for this visit:    Wellness examination  -     Comp Metabolic Panel (14); Future  -     CBC With Differential With Platelet; Future  -     Hemoglobin A1C; Future  -     Lipid Panel; Future  -     TSH W Reflex To Free T4; Future    Cervical cancer screening  -     ThinPrep PAP with HPV Reflex Request; Future  -     ThinPrep PAP with HPV Reflex Request    Encounter for well woman exam  -     BREAST AND PELVIC EXAM []    Encounter for screening mammogram for malignant neoplasm of breast  -     3D Mammogram Digital Screen, Bilateral (CPT=77067/62478); Future    Elevated glucose  -     Hemoglobin A1C; Future    Lipid screening  -     Lipid Panel; Future    Screening for  deficiency anemia  -     CBC With Differential With Platelet; Future    Thyroid disorder screen  -     TSH W Reflex To Free T4; Future      The patient indicates understanding of these issues and agrees to the plan.    Problem List:  There is no problem list on file for this patient.      Teena Lloyd, RAFA  2/17/2025  9:35 AM

## 2025-02-24 RX ORDER — VALACYCLOVIR HYDROCHLORIDE 1 G/1
1000 TABLET, FILM COATED ORAL DAILY
Qty: 10 TABLET | Refills: 0 | Status: SHIPPED | OUTPATIENT
Start: 2025-02-24

## 2025-02-24 NOTE — TELEPHONE ENCOUNTER
No protocol    OV 02/17/25 physical with RAFA Dickinson. 11/20/24 with Dr. Parmar    REFILL 08/14/24 #10    No future appointments.     No

## 2025-03-20 ENCOUNTER — HOSPITAL ENCOUNTER (OUTPATIENT)
Age: 60
Discharge: HOME | End: 2025-03-20
Payer: COMMERCIAL

## 2025-03-20 VITALS
HEIGHT: 69 IN | BODY MASS INDEX: 22.22 KG/M2 | SYSTOLIC BLOOD PRESSURE: 120 MMHG | HEART RATE: 66 BPM | TEMPERATURE: 98 F | WEIGHT: 150 LBS | RESPIRATION RATE: 16 BRPM | OXYGEN SATURATION: 99 % | DIASTOLIC BLOOD PRESSURE: 77 MMHG

## 2025-03-20 DIAGNOSIS — J01.00 ACUTE NON-RECURRENT MAXILLARY SINUSITIS: Primary | ICD-10-CM

## 2025-03-20 PROCEDURE — 99213 OFFICE O/P EST LOW 20 MIN: CPT | Performed by: NURSE PRACTITIONER

## 2025-03-20 NOTE — ED INITIAL ASSESSMENT (HPI)
Sinus congestion and pressure since Friday. No fever. Green/yellow mucous. Otc medications not helping.

## 2025-03-20 NOTE — ED PROVIDER NOTES
Patient Seen in: [facility]      History     Chief Complaint   Patient presents with    Sinus Problem     Entered by patient     Stated Complaint: Sinus Problem    Subjective:   59-year-old female presents with complaint of sinus congestion, sinus pressure, nasal congestion with green/yellow mucus that began 7 days ago.  History of sinus surgery in .    OTC meds not helping.     Pt denies fever, chills, diaphoresis, shortness of breath, dizziness, nausea, vomiting.     The history is provided by the patient.         Objective:     Past Medical History:    Allergic rhinitis    Benign cyst of right kidney    followed by Dr Deven Marie- [facility], [place];e MRI 23    Hyperlipidemia    Hypothyroidism              Past Surgical History:   Procedure Laterality Date    Cholecystectomy      Colonoscopy            Other      sinus surgery    Sinus surgery                    Social History     Socioeconomic History    Marital status:    Tobacco Use    Smoking status: Never     Passive exposure: Never    Smokeless tobacco: Never   Vaping Use    Vaping status: Never Used   Substance and Sexual Activity    Alcohol use: Not Currently     Comment: none x 5 months    Drug use: Never   Other Topics Concern    Caffeine Concern No    Exercise No    Seat Belt No    Special Diet No    Stress Concern No    Weight Concern Yes     Comment: Would like to loose some weight     Social Drivers of Health     Food Insecurity: No Food Insecurity (2025)    NCSS - Food Insecurity     Worried About Running Out of Food in the Last Year: No     Ran Out of Food in the Last Year: No   Transportation Needs: No Transportation Needs (2025)    NCSS - Transportation     Lack of Transportation: No   Housing Stability: Not At Risk (2025)    NCSS - Housing/Utilities     Has Housing: Yes     Worried About Losing Housing: No     Unable to Get Utilities: No              Review of Systems   Constitutional:   Negative for chills and fever.   HENT:  Positive for congestion, sinus pressure and sinus pain.    Respiratory:  Positive for cough. Negative for shortness of breath.    Cardiovascular:  Negative for chest pain.   Gastrointestinal:  Negative for nausea and vomiting.   Neurological:  Negative for dizziness.       Positive for stated complaint: Sinus Problem  Other systems are as noted in HPI.  Constitutional and vital signs reviewed.      All other systems reviewed and negative except as noted above.    Physical Exam     ED Triage Vitals [03/20/25 1538]   /77   Pulse 66   Resp 16   Temp 98.1 °F (36.7 °C)   Temp src Oral   SpO2 99 %   O2 Device None (Room air)       Current Vitals:   Vital Signs  BP: 120/77  Pulse: 66  Resp: 16  Temp: 98.1 °F (36.7 °C)  Temp src: Oral    Oxygen Therapy  SpO2: 99 %  O2 Device: None (Room air)        Physical Exam  Vitals and nursing note reviewed.   Constitutional:       Appearance: Normal appearance. She is not ill-appearing.   HENT:      Head: Normocephalic.      Right Ear: Tympanic membrane normal.      Left Ear: Tympanic membrane normal.      Nose: Congestion present.      Right Sinus: Maxillary sinus tenderness present. No frontal sinus tenderness.      Left Sinus: Maxillary sinus tenderness present. No frontal sinus tenderness.      Mouth/Throat:      Mouth: Mucous membranes are moist.      Pharynx: Oropharynx is clear. No posterior oropharyngeal erythema.   Eyes:      Conjunctiva/sclera: Conjunctivae normal.   Cardiovascular:      Rate and Rhythm: Normal rate and regular rhythm.   Pulmonary:      Effort: Pulmonary effort is normal. No respiratory distress.      Breath sounds: Normal breath sounds. No stridor. No wheezing, rhonchi or rales.   Musculoskeletal:      Cervical back: Normal range of motion and neck supple.   Lymphadenopathy:      Cervical: No cervical adenopathy.   Skin:     General: Skin is warm and dry.   Neurological:      Mental Status: She is alert.    Psychiatric:         Mood and Affect: Mood normal.             ED Course   Labs Reviewed - No data to display     MDM      Medical Decision Making  59-year-old female presents with complaint of sinus congestion, sinus pressure, nasal congestion with green/yellow mucus that began 7 days ago.  Differential diagnoses include allergic rhinitis, sinusitis, influenza/COVID.  On exam, pt is well appearing with normal vitals, lungs clear.   Will treat for sinusitis with Augmentin, nasal saline, Flonase, push fluids, steam.   Follow up with ENT if not improving or sinusitis reoccurs.  PCP if needed.   Pt agreeable to plan.       Amount and/or Complexity of Data Reviewed  External Data Reviewed: notes.    Risk  OTC drugs.  Prescription drug management.        Disposition and Plan     Clinical Impression:  1. Acute non-recurrent maxillary sinusitis         Disposition:  Discharge  3/20/2025  4:11 pm    Follow-up:  Jason Parmar, DO  [address] [facility] [address]  [phone]    In 1 week  If symptoms worsen          Medications Prescribed:  Discharge Medication List as of 3/20/2025  4:19 PM              Supplementary Documentation:

## 2025-03-20 NOTE — ED NOTES
Patient calling to request rx to be sent to the [facility] in [place]. Provider notified.

## 2025-03-20 NOTE — DISCHARGE INSTRUCTIONS
We recommend the following for your condition:  Augmentin: take 1 tab twice a day for 7 days.   Flonase 2 sprays to each side of nose once a day - con't for 2-4 weeks  Nasal saline - either spray (\"Ocean\" brand) or Willy Med Sinus Rinse 3 times a day  Rest and push fluids  Hot lemon tea with honey  Steam twice a day (either in shower or with cup of hot water and a towel over your head)  Follow up with PCP if not improving in the next week.

## 2025-03-25 ENCOUNTER — HOSPITAL ENCOUNTER (OUTPATIENT)
Dept: MAMMOGRAPHY | Age: 60
Discharge: HOME | End: 2025-03-25
Payer: COMMERCIAL

## 2025-03-25 DIAGNOSIS — Z12.31 ENCOUNTER FOR SCREENING MAMMOGRAM FOR MALIGNANT NEOPLASM OF BREAST: ICD-10-CM

## 2025-03-25 PROCEDURE — 77063 BREAST TOMOSYNTHESIS BI: CPT

## 2025-03-25 PROCEDURE — 77067 SCR MAMMO BI INCL CAD: CPT

## 2025-07-18 RX ORDER — ROSUVASTATIN CALCIUM 20 MG/1
20 TABLET, COATED ORAL NIGHTLY
Qty: 90 TABLET | Refills: 1 | Status: SHIPPED | OUTPATIENT
Start: 2025-07-18

## 2025-07-18 NOTE — TELEPHONE ENCOUNTER
OV 02/17/25 physical with RAFA Dickinson  LABS 02/17/25 lipid    REFILL 09/13/24 #90 +2 RF    No future appointments.